# Patient Record
Sex: FEMALE | Race: WHITE | NOT HISPANIC OR LATINO
[De-identification: names, ages, dates, MRNs, and addresses within clinical notes are randomized per-mention and may not be internally consistent; named-entity substitution may affect disease eponyms.]

---

## 2018-01-17 ENCOUNTER — RESULT REVIEW (OUTPATIENT)
Age: 33
End: 2018-01-17

## 2018-07-23 ENCOUNTER — OUTPATIENT (OUTPATIENT)
Dept: OUTPATIENT SERVICES | Facility: HOSPITAL | Age: 33
LOS: 1 days | End: 2018-07-23
Payer: COMMERCIAL

## 2018-07-23 DIAGNOSIS — O26.899 OTHER SPECIFIED PREGNANCY RELATED CONDITIONS, UNSPECIFIED TRIMESTER: ICD-10-CM

## 2018-07-23 DIAGNOSIS — Z3A.00 WEEKS OF GESTATION OF PREGNANCY NOT SPECIFIED: ICD-10-CM

## 2018-07-23 PROCEDURE — 59025 FETAL NON-STRESS TEST: CPT

## 2018-07-23 PROCEDURE — 99214 OFFICE O/P EST MOD 30 MIN: CPT

## 2018-07-23 RX ADMIN — Medication 12 MILLIGRAM(S): at 14:03

## 2018-07-24 ENCOUNTER — OUTPATIENT (OUTPATIENT)
Dept: OUTPATIENT SERVICES | Facility: HOSPITAL | Age: 33
LOS: 1 days | End: 2018-07-24
Payer: COMMERCIAL

## 2018-07-24 DIAGNOSIS — Z3A.00 WEEKS OF GESTATION OF PREGNANCY NOT SPECIFIED: ICD-10-CM

## 2018-07-24 DIAGNOSIS — O26.899 OTHER SPECIFIED PREGNANCY RELATED CONDITIONS, UNSPECIFIED TRIMESTER: ICD-10-CM

## 2018-07-24 LAB
COLLECT DURATION TIME UR: 24 HR — SIGNIFICANT CHANGE UP
COLLECT DURATION TIME UR: 24 HR — SIGNIFICANT CHANGE UP
PROT 24H UR-MRATE: SIGNIFICANT CHANGE UP MG/24 H (ref 50–100)
TOTAL VOLUME - 24 HOUR: 2400 ML — SIGNIFICANT CHANGE UP
TOTAL VOLUME - 24 HOUR: 2400 ML — SIGNIFICANT CHANGE UP
URINE CREATININE CALCULATION: SIGNIFICANT CHANGE UP G/24 H (ref 0.8–1.8)

## 2018-07-24 PROCEDURE — 99214 OFFICE O/P EST MOD 30 MIN: CPT

## 2018-07-24 PROCEDURE — 76818 FETAL BIOPHYS PROFILE W/NST: CPT

## 2018-07-24 PROCEDURE — 84156 ASSAY OF PROTEIN URINE: CPT

## 2018-07-24 PROCEDURE — 82570 ASSAY OF URINE CREATININE: CPT

## 2018-07-24 RX ADMIN — Medication 12 MILLIGRAM(S): at 16:13

## 2018-07-27 ENCOUNTER — OUTPATIENT (OUTPATIENT)
Dept: OUTPATIENT SERVICES | Facility: HOSPITAL | Age: 33
LOS: 1 days | End: 2018-07-27
Payer: COMMERCIAL

## 2018-07-27 DIAGNOSIS — O26.899 OTHER SPECIFIED PREGNANCY RELATED CONDITIONS, UNSPECIFIED TRIMESTER: ICD-10-CM

## 2018-07-27 DIAGNOSIS — Z3A.00 WEEKS OF GESTATION OF PREGNANCY NOT SPECIFIED: ICD-10-CM

## 2018-07-27 LAB
COLLECT DURATION TIME UR: 24 HR — SIGNIFICANT CHANGE UP
COLLECT DURATION TIME UR: 24 HR — SIGNIFICANT CHANGE UP
PROT 24H UR-MRATE: 208 MG/24 H — HIGH (ref 50–100)
TOTAL VOLUME - 24 HOUR: 2600 ML — SIGNIFICANT CHANGE UP
TOTAL VOLUME - 24 HOUR: 2600 ML — SIGNIFICANT CHANGE UP
URINE CREATININE CALCULATION: 1.1 G/24 H — SIGNIFICANT CHANGE UP (ref 0.8–1.8)
URINE CREATININE CALCULATION: 1.1 G/24 H — SIGNIFICANT CHANGE UP (ref 0.8–1.8)

## 2018-07-27 PROCEDURE — 99214 OFFICE O/P EST MOD 30 MIN: CPT

## 2018-07-27 PROCEDURE — 82570 ASSAY OF URINE CREATININE: CPT

## 2018-07-27 PROCEDURE — 84156 ASSAY OF PROTEIN URINE: CPT

## 2018-07-27 PROCEDURE — 59025 FETAL NON-STRESS TEST: CPT

## 2018-07-29 ENCOUNTER — OUTPATIENT (OUTPATIENT)
Dept: OUTPATIENT SERVICES | Facility: HOSPITAL | Age: 33
LOS: 1 days | End: 2018-07-29
Payer: COMMERCIAL

## 2018-07-29 DIAGNOSIS — Z3A.00 WEEKS OF GESTATION OF PREGNANCY NOT SPECIFIED: ICD-10-CM

## 2018-07-29 DIAGNOSIS — O26.899 OTHER SPECIFIED PREGNANCY RELATED CONDITIONS, UNSPECIFIED TRIMESTER: ICD-10-CM

## 2018-07-29 PROCEDURE — 76818 FETAL BIOPHYS PROFILE W/NST: CPT

## 2018-07-29 PROCEDURE — 99214 OFFICE O/P EST MOD 30 MIN: CPT

## 2018-08-01 DIAGNOSIS — O09.613 SUPERVISION OF YOUNG PRIMIGRAVIDA, THIRD TRIMESTER: ICD-10-CM

## 2018-08-11 ENCOUNTER — OUTPATIENT (OUTPATIENT)
Dept: OUTPATIENT SERVICES | Facility: HOSPITAL | Age: 33
LOS: 1 days | End: 2018-08-11
Payer: COMMERCIAL

## 2018-08-11 DIAGNOSIS — O26.899 OTHER SPECIFIED PREGNANCY RELATED CONDITIONS, UNSPECIFIED TRIMESTER: ICD-10-CM

## 2018-08-11 DIAGNOSIS — Z3A.00 WEEKS OF GESTATION OF PREGNANCY NOT SPECIFIED: ICD-10-CM

## 2018-08-11 PROCEDURE — 76818 FETAL BIOPHYS PROFILE W/NST: CPT

## 2018-08-11 PROCEDURE — 99214 OFFICE O/P EST MOD 30 MIN: CPT

## 2018-08-13 ENCOUNTER — RESULT REVIEW (OUTPATIENT)
Age: 33
End: 2018-08-13

## 2018-08-13 ENCOUNTER — TRANSCRIPTION ENCOUNTER (OUTPATIENT)
Age: 33
End: 2018-08-13

## 2018-08-13 ENCOUNTER — INPATIENT (INPATIENT)
Facility: HOSPITAL | Age: 33
LOS: 3 days | Discharge: HOME | End: 2018-08-17
Attending: OBSTETRICS & GYNECOLOGY | Admitting: OBSTETRICS & GYNECOLOGY
Payer: COMMERCIAL

## 2018-08-13 VITALS — HEIGHT: 64 IN | WEIGHT: 149.91 LBS

## 2018-08-13 DIAGNOSIS — J45.20 MILD INTERMITTENT ASTHMA, UNCOMPLICATED: ICD-10-CM

## 2018-08-13 DIAGNOSIS — Z3A.37 37 WEEKS GESTATION OF PREGNANCY: ICD-10-CM

## 2018-08-13 DIAGNOSIS — O43.123 VELAMENTOUS INSERTION OF UMBILICAL CORD, THIRD TRIMESTER: ICD-10-CM

## 2018-08-13 DIAGNOSIS — O99.89 OTHER SPECIFIED DISEASES AND CONDITIONS COMPLICATING PREGNANCY, CHILDBIRTH AND THE PUERPERIUM: ICD-10-CM

## 2018-08-13 DIAGNOSIS — Q99.8 OTHER SPECIFIED CHROMOSOME ABNORMALITIES: ICD-10-CM

## 2018-08-13 DIAGNOSIS — O36.5930 MATERNAL CARE FOR OTHER KNOWN OR SUSPECTED POOR FETAL GROWTH, THIRD TRIMESTER, NOT APPLICABLE OR UNSPECIFIED: ICD-10-CM

## 2018-08-13 LAB
ALBUMIN SERPL ELPH-MCNC: 3.5 G/DL — SIGNIFICANT CHANGE UP (ref 3.3–5)
ALP SERPL-CCNC: 151 U/L — HIGH (ref 40–120)
ALT FLD-CCNC: 23 U/L — SIGNIFICANT CHANGE UP (ref 10–45)
ANION GAP SERPL CALC-SCNC: 14 MMOL/L — SIGNIFICANT CHANGE UP (ref 5–17)
APPEARANCE UR: CLEAR — SIGNIFICANT CHANGE UP
APTT BLD: 25.4 SEC — LOW (ref 27.5–37.4)
AST SERPL-CCNC: 25 U/L — SIGNIFICANT CHANGE UP (ref 10–40)
BASOPHILS NFR BLD AUTO: 0.1 % — SIGNIFICANT CHANGE UP (ref 0–2)
BILIRUB SERPL-MCNC: 0.4 MG/DL — SIGNIFICANT CHANGE UP (ref 0.2–1.2)
BILIRUB UR-MCNC: NEGATIVE — SIGNIFICANT CHANGE UP
BLD GP AB SCN SERPL QL: NEGATIVE — SIGNIFICANT CHANGE UP
BUN SERPL-MCNC: 6 MG/DL — LOW (ref 7–23)
CALCIUM SERPL-MCNC: 9.1 MG/DL — SIGNIFICANT CHANGE UP (ref 8.4–10.5)
CHLORIDE SERPL-SCNC: 102 MMOL/L — SIGNIFICANT CHANGE UP (ref 96–108)
CO2 SERPL-SCNC: 22 MMOL/L — SIGNIFICANT CHANGE UP (ref 22–31)
COLOR SPEC: YELLOW — SIGNIFICANT CHANGE UP
CREAT SERPL-MCNC: 0.44 MG/DL — LOW (ref 0.5–1.3)
DIFF PNL FLD: NEGATIVE — SIGNIFICANT CHANGE UP
EOSINOPHIL NFR BLD AUTO: 0.8 % — SIGNIFICANT CHANGE UP (ref 0–6)
FIBRINOGEN PPP-MCNC: 415 MG/DL — SIGNIFICANT CHANGE UP (ref 258–438)
GLUCOSE SERPL-MCNC: 78 MG/DL — SIGNIFICANT CHANGE UP (ref 70–99)
GLUCOSE UR QL: NEGATIVE — SIGNIFICANT CHANGE UP
HCT VFR BLD CALC: 37.2 % — SIGNIFICANT CHANGE UP (ref 34.5–45)
HGB BLD-MCNC: 11.9 G/DL — SIGNIFICANT CHANGE UP (ref 11.5–15.5)
INR BLD: 0.92 — SIGNIFICANT CHANGE UP (ref 0.88–1.16)
KETONES UR-MCNC: NEGATIVE — SIGNIFICANT CHANGE UP
LDH SERPL L TO P-CCNC: 226 U/L — SIGNIFICANT CHANGE UP (ref 50–242)
LEUKOCYTE ESTERASE UR-ACNC: NEGATIVE — SIGNIFICANT CHANGE UP
LYMPHOCYTES # BLD AUTO: 11.6 % — LOW (ref 13–44)
MCHC RBC-ENTMCNC: 28.9 PG — SIGNIFICANT CHANGE UP (ref 27–34)
MCHC RBC-ENTMCNC: 32 G/DL — SIGNIFICANT CHANGE UP (ref 32–36)
MCV RBC AUTO: 90.3 FL — SIGNIFICANT CHANGE UP (ref 80–100)
MONOCYTES NFR BLD AUTO: 5.8 % — SIGNIFICANT CHANGE UP (ref 2–14)
NEUTROPHILS NFR BLD AUTO: 81.7 % — HIGH (ref 43–77)
NITRITE UR-MCNC: NEGATIVE — SIGNIFICANT CHANGE UP
PH UR: 7.5 — SIGNIFICANT CHANGE UP (ref 5–8)
PLATELET # BLD AUTO: 145 K/UL — LOW (ref 150–400)
POTASSIUM SERPL-MCNC: 4.3 MMOL/L — SIGNIFICANT CHANGE UP (ref 3.5–5.3)
POTASSIUM SERPL-SCNC: 4.3 MMOL/L — SIGNIFICANT CHANGE UP (ref 3.5–5.3)
PROT SERPL-MCNC: 6.7 G/DL — SIGNIFICANT CHANGE UP (ref 6–8.3)
PROT UR-MCNC: NEGATIVE MG/DL — SIGNIFICANT CHANGE UP
PROTHROM AB SERPL-ACNC: 10.2 SEC — SIGNIFICANT CHANGE UP (ref 9.8–12.7)
RBC # BLD: 4.12 M/UL — SIGNIFICANT CHANGE UP (ref 3.8–5.2)
RBC # FLD: 13.5 % — SIGNIFICANT CHANGE UP (ref 10.3–16.9)
RH IG SCN BLD-IMP: POSITIVE — SIGNIFICANT CHANGE UP
RH IG SCN BLD-IMP: POSITIVE — SIGNIFICANT CHANGE UP
SODIUM SERPL-SCNC: 138 MMOL/L — SIGNIFICANT CHANGE UP (ref 135–145)
SP GR SPEC: 1.01 — SIGNIFICANT CHANGE UP (ref 1–1.03)
URATE SERPL-MCNC: 3.4 MG/DL — SIGNIFICANT CHANGE UP (ref 2.5–7)
UROBILINOGEN FLD QL: 0.2 E.U./DL — SIGNIFICANT CHANGE UP
WBC # BLD: 10.7 K/UL — HIGH (ref 3.8–10.5)
WBC # FLD AUTO: 10.7 K/UL — HIGH (ref 3.8–10.5)

## 2018-08-13 RX ORDER — CITRIC ACID/SODIUM CITRATE 300-500 MG
30 SOLUTION, ORAL ORAL ONCE
Qty: 0 | Refills: 0 | Status: DISCONTINUED | OUTPATIENT
Start: 2018-08-13 | End: 2018-08-16

## 2018-08-13 RX ORDER — HEPARIN SODIUM 5000 [USP'U]/ML
5000 INJECTION INTRAVENOUS; SUBCUTANEOUS EVERY 12 HOURS
Qty: 0 | Refills: 0 | Status: DISCONTINUED | OUTPATIENT
Start: 2018-08-13 | End: 2018-08-17

## 2018-08-13 RX ORDER — IBUPROFEN 200 MG
600 TABLET ORAL EVERY 6 HOURS
Qty: 0 | Refills: 0 | Status: DISCONTINUED | OUTPATIENT
Start: 2018-08-13 | End: 2018-08-17

## 2018-08-13 RX ORDER — LANOLIN
1 OINTMENT (GRAM) TOPICAL
Qty: 0 | Refills: 0 | Status: DISCONTINUED | OUTPATIENT
Start: 2018-08-13 | End: 2018-08-17

## 2018-08-13 RX ORDER — ONDANSETRON 8 MG/1
4 TABLET, FILM COATED ORAL EVERY 6 HOURS
Qty: 0 | Refills: 0 | Status: DISCONTINUED | OUTPATIENT
Start: 2018-08-13 | End: 2018-08-17

## 2018-08-13 RX ORDER — SODIUM CHLORIDE 9 MG/ML
1000 INJECTION, SOLUTION INTRAVENOUS ONCE
Qty: 0 | Refills: 0 | Status: DISCONTINUED | OUTPATIENT
Start: 2018-08-13 | End: 2018-08-13

## 2018-08-13 RX ORDER — OXYCODONE AND ACETAMINOPHEN 5; 325 MG/1; MG/1
1 TABLET ORAL
Qty: 0 | Refills: 0 | Status: DISCONTINUED | OUTPATIENT
Start: 2018-08-13 | End: 2018-08-17

## 2018-08-13 RX ORDER — NALOXONE HYDROCHLORIDE 4 MG/.1ML
0.1 SPRAY NASAL
Qty: 0 | Refills: 0 | Status: DISCONTINUED | OUTPATIENT
Start: 2018-08-13 | End: 2018-08-17

## 2018-08-13 RX ORDER — SODIUM CHLORIDE 9 MG/ML
1000 INJECTION, SOLUTION INTRAVENOUS
Qty: 0 | Refills: 0 | Status: DISCONTINUED | OUTPATIENT
Start: 2018-08-13 | End: 2018-08-17

## 2018-08-13 RX ORDER — TETANUS TOXOID, REDUCED DIPHTHERIA TOXOID AND ACELLULAR PERTUSSIS VACCINE, ADSORBED 5; 2.5; 8; 8; 2.5 [IU]/.5ML; [IU]/.5ML; UG/.5ML; UG/.5ML; UG/.5ML
0.5 SUSPENSION INTRAMUSCULAR ONCE
Qty: 0 | Refills: 0 | Status: DISCONTINUED | OUTPATIENT
Start: 2018-08-13 | End: 2018-08-17

## 2018-08-13 RX ORDER — OXYCODONE AND ACETAMINOPHEN 5; 325 MG/1; MG/1
2 TABLET ORAL EVERY 6 HOURS
Qty: 0 | Refills: 0 | Status: DISCONTINUED | OUTPATIENT
Start: 2018-08-13 | End: 2018-08-17

## 2018-08-13 RX ORDER — GLYCERIN ADULT
1 SUPPOSITORY, RECTAL RECTAL AT BEDTIME
Qty: 0 | Refills: 0 | Status: DISCONTINUED | OUTPATIENT
Start: 2018-08-13 | End: 2018-08-17

## 2018-08-13 RX ORDER — OXYTOCIN 10 UNIT/ML
1 VIAL (ML) INJECTION
Qty: 30 | Refills: 0 | Status: DISCONTINUED | OUTPATIENT
Start: 2018-08-13 | End: 2018-08-17

## 2018-08-13 RX ORDER — OXYTOCIN 10 UNIT/ML
41.67 VIAL (ML) INJECTION
Qty: 20 | Refills: 0 | Status: DISCONTINUED | OUTPATIENT
Start: 2018-08-13 | End: 2018-08-17

## 2018-08-13 RX ORDER — OXYTOCIN 10 UNIT/ML
333.33 VIAL (ML) INJECTION
Qty: 20 | Refills: 0 | Status: DISCONTINUED | OUTPATIENT
Start: 2018-08-13 | End: 2018-08-13

## 2018-08-13 RX ORDER — SIMETHICONE 80 MG/1
80 TABLET, CHEWABLE ORAL EVERY 4 HOURS
Qty: 0 | Refills: 0 | Status: DISCONTINUED | OUTPATIENT
Start: 2018-08-13 | End: 2018-08-17

## 2018-08-13 RX ORDER — DIPHENHYDRAMINE HCL 50 MG
25 CAPSULE ORAL EVERY 6 HOURS
Qty: 0 | Refills: 0 | Status: DISCONTINUED | OUTPATIENT
Start: 2018-08-13 | End: 2018-08-17

## 2018-08-13 RX ORDER — SODIUM CHLORIDE 9 MG/ML
1000 INJECTION, SOLUTION INTRAVENOUS
Qty: 0 | Refills: 0 | Status: DISCONTINUED | OUTPATIENT
Start: 2018-08-13 | End: 2018-08-13

## 2018-08-13 RX ORDER — DOCUSATE SODIUM 100 MG
100 CAPSULE ORAL
Qty: 0 | Refills: 0 | Status: DISCONTINUED | OUTPATIENT
Start: 2018-08-13 | End: 2018-08-17

## 2018-08-13 RX ORDER — CEFAZOLIN SODIUM 1 G
2000 VIAL (EA) INJECTION ONCE
Qty: 0 | Refills: 0 | Status: DISCONTINUED | OUTPATIENT
Start: 2018-08-13 | End: 2018-08-16

## 2018-08-13 RX ORDER — ACETAMINOPHEN 500 MG
650 TABLET ORAL EVERY 6 HOURS
Qty: 0 | Refills: 0 | Status: DISCONTINUED | OUTPATIENT
Start: 2018-08-13 | End: 2018-08-17

## 2018-08-13 RX ORDER — ALBUTEROL 90 UG/1
2 AEROSOL, METERED ORAL EVERY 6 HOURS
Qty: 0 | Refills: 0 | Status: DISCONTINUED | OUTPATIENT
Start: 2018-08-13 | End: 2018-08-17

## 2018-08-13 RX ORDER — DINOPROSTONE 10 MG/241MG
10 INSERT VAGINAL ONCE
Qty: 0 | Refills: 0 | Status: COMPLETED | OUTPATIENT
Start: 2018-08-13 | End: 2018-08-13

## 2018-08-13 RX ORDER — FERROUS SULFATE 325(65) MG
325 TABLET ORAL DAILY
Qty: 0 | Refills: 0 | Status: DISCONTINUED | OUTPATIENT
Start: 2018-08-13 | End: 2018-08-17

## 2018-08-13 RX ADMIN — DINOPROSTONE 10 MILLIGRAM(S): 10 INSERT VAGINAL at 13:30

## 2018-08-13 RX ADMIN — SODIUM CHLORIDE 125 MILLILITER(S): 9 INJECTION, SOLUTION INTRAVENOUS at 12:16

## 2018-08-13 NOTE — DISCHARGE NOTE OB - CARE PROVIDER_API CALL
Larisa Covarrubias (DO; MS), Obstetrics and Gynecology  [address] [place] [address]  Phone: [phone]  Fax: [phone]

## 2018-08-13 NOTE — PATIENT PROFILE OB - CURRENT PREGNANCY COMPLICATIONS, OB PROFILE
cord insertion/Intrauterine Growth Restriction marginal cord insertion/Intrauterine Growth Restriction

## 2018-08-13 NOTE — DISCHARGE NOTE OB - CARE PLAN
Principal Discharge DX:	Postpartum state  Goal:	discharge home  Assessment and plan of treatment:	Patient to be discharged home. Nothing per vagina, no tampons tub baths, intercourse. Patient to notify provider for fever >101, heavy vaginal bleeding, extreme abdominal pain. patient to follow up in 2 weeks

## 2018-08-13 NOTE — DISCHARGE NOTE OB - MATERIALS PROVIDED
Back To Sleep Handout/Tdap Vaccination (VIS Pub Date: January 24, 2012)/Guide to Postpartum Care/Shaken Baby Prevention Handout/Breastfeeding Guide and Packet/Birth Certificate Instructions

## 2018-08-13 NOTE — DISCHARGE NOTE OB - PATIENT PORTAL LINK FT
You can access the [employer] [facility] Patient Portal, offered by [facility], by registering with the following website: [email or web address]

## 2018-08-13 NOTE — DISCHARGE NOTE OB - PLAN OF CARE
discharge home Patient to be discharged home. Nothing per vagina, no tampons tub baths, intercourse. Patient to notify provider for fever >101, heavy vaginal bleeding, extreme abdominal pain. patient to follow up in 2 weeks

## 2018-08-14 LAB
HCT VFR BLD CALC: 36.5 % — SIGNIFICANT CHANGE UP (ref 34.5–45)
HGB BLD-MCNC: 12.1 G/DL — SIGNIFICANT CHANGE UP (ref 11.5–15.5)
MCHC RBC-ENTMCNC: 29.9 PG — SIGNIFICANT CHANGE UP (ref 27–34)
MCHC RBC-ENTMCNC: 33.2 G/DL — SIGNIFICANT CHANGE UP (ref 32–36)
MCV RBC AUTO: 90.1 FL — SIGNIFICANT CHANGE UP (ref 80–100)
PLATELET # BLD AUTO: 146 K/UL — LOW (ref 150–400)
RBC # BLD: 4.05 M/UL — SIGNIFICANT CHANGE UP (ref 3.8–5.2)
RBC # FLD: 13.2 % — SIGNIFICANT CHANGE UP (ref 10.3–16.9)
T PALLIDUM AB TITR SER: NEGATIVE — SIGNIFICANT CHANGE UP
WBC # BLD: 18.5 K/UL — HIGH (ref 3.8–10.5)
WBC # FLD AUTO: 18.5 K/UL — HIGH (ref 3.8–10.5)

## 2018-08-14 RX ADMIN — Medication 600 MILLIGRAM(S): at 07:12

## 2018-08-14 RX ADMIN — Medication 600 MILLIGRAM(S): at 19:00

## 2018-08-14 RX ADMIN — Medication 100 MILLIGRAM(S): at 23:58

## 2018-08-14 RX ADMIN — HEPARIN SODIUM 5000 UNIT(S): 5000 INJECTION INTRAVENOUS; SUBCUTANEOUS at 18:13

## 2018-08-14 RX ADMIN — Medication 600 MILLIGRAM(S): at 18:13

## 2018-08-14 RX ADMIN — Medication 600 MILLIGRAM(S): at 13:23

## 2018-08-14 RX ADMIN — Medication 600 MILLIGRAM(S): at 12:41

## 2018-08-14 RX ADMIN — Medication 600 MILLIGRAM(S): at 23:57

## 2018-08-14 RX ADMIN — Medication 325 MILLIGRAM(S): at 12:41

## 2018-08-14 RX ADMIN — Medication 600 MILLIGRAM(S): at 06:46

## 2018-08-14 RX ADMIN — Medication 1 TABLET(S): at 12:41

## 2018-08-14 RX ADMIN — HEPARIN SODIUM 5000 UNIT(S): 5000 INJECTION INTRAVENOUS; SUBCUTANEOUS at 06:46

## 2018-08-14 NOTE — PROGRESS NOTE ADULT - ASSESSMENT
33y Female POD# 1   s/p C/S, Uncomplicated                                       1. Neuro/Pain:  OPM  2  CV:  VS per routine  3. Pulm: Encourage ISS & Ambulation  4. GI:  Reg  5. : Voiding  6. DVT ppx: SCDs, SQH 5000 mg BID  7. Dispo: POD #3 or #4

## 2018-08-14 NOTE — PROGRESS NOTE ADULT - SUBJECTIVE AND OBJECTIVE BOX
Patient evaluated at bedside.   She reports pain is well controlled.  She denies headache, dizziness, chest pain, palpitations, shortness of breathe, nausea, vomiting or heavy vaginal bleeding.  She has not yet been ambulating without assistance, had rios catheter removed this AM and is awaiting TOV, is not yet passing gas, tolerating regular diet and is breastfeeding.    Physical Exam:  Vital Signs Last 24 Hrs  T(C): 36.7 (14 Aug 2018 06:00), Max: 37.1 (14 Aug 2018 02:00)  T(F): 98 (14 Aug 2018 06:00), Max: 98.7 (14 Aug 2018 02:00)  HR: 68 (14 Aug 2018 06:00) (60 - 82)  BP: 106/65 (14 Aug 2018 06:00) (106/65 - 137/78)  BP(mean): --  RR: 16 (14 Aug 2018 06:00) (16 - 20)  SpO2: 99% (14 Aug 2018 06:00) (97% - 100%)    08-13 @ 07:01  -  08-14 @ 07:00  --------------------------------------------------------  IN: 500 mL / OUT: 2715 mL / NET: -2215 mL        GA: NAD, A+0 x 3  CV: RRR  Pulm: Normal work of breathing  Breasts: soft, nontender, no palpable masses  Abd: + BS, soft, nontender, nondistended, no rebound or guarding, uterus firm at midline,  fundus below umbilicus  Incision: well approximated, no erythema or discharge  : lochia WNL  Extremities: no swelling or calf tenderness                            11.9   10.7  )-----------( 145      ( 13 Aug 2018 12:40 )             37.2     08-13    138  |  102  |  6<L>  ----------------------------<  78  4.3   |  22  |  0.44<L>    Ca    9.1      13 Aug 2018 12:40    TPro  6.7  /  Alb  3.5  /  TBili  0.4  /  DBili  x   /  AST  25  /  ALT  23  /  AlkPhos  151<H>  08-13      PT/INR - ( 13 Aug 2018 12:40 )   PT: 10.2 sec;   INR: 0.92          PTT - ( 13 Aug 2018 12:40 )  PTT:25.4 sec Patient evaluated at bedside.   She reports pain is well controlled.  She denies headache, dizziness, chest pain, palpitations, shortness of breathe, nausea, vomiting or heavy vaginal bleeding.  She has not yet been ambulating without assistance, had rios catheter removed this AM and is awaiting TOV, is not yet passing gas, tolerating regular diet and is expressing.    Physical Exam:  Vital Signs Last 24 Hrs  T(C): 36.7 (14 Aug 2018 06:00), Max: 37.1 (14 Aug 2018 02:00)  T(F): 98 (14 Aug 2018 06:00), Max: 98.7 (14 Aug 2018 02:00)  HR: 68 (14 Aug 2018 06:00) (60 - 82)  BP: 106/65 (14 Aug 2018 06:00) (106/65 - 137/78)  BP(mean): --  RR: 16 (14 Aug 2018 06:00) (16 - 20)  SpO2: 99% (14 Aug 2018 06:00) (97% - 100%)    08-13 @ 07:01  -  08-14 @ 07:00  --------------------------------------------------------  IN: 500 mL / OUT: 2715 mL / NET: -2215 mL        GA: NAD, A+0 x 3  CV: RRR  Pulm: Normal work of breathing  Breasts: soft, nontender, no palpable masses  Abd: + BS, soft, nontender, nondistended, no rebound or guarding, uterus firm at midline,  fundus below umbilicus  Incision: well approximated, no erythema or discharge  : lochia WNL  Extremities: no swelling or calf tenderness                            11.9   10.7  )-----------( 145      ( 13 Aug 2018 12:40 )             37.2     08-13    138  |  102  |  6<L>  ----------------------------<  78  4.3   |  22  |  0.44<L>    Ca    9.1      13 Aug 2018 12:40    TPro  6.7  /  Alb  3.5  /  TBili  0.4  /  DBili  x   /  AST  25  /  ALT  23  /  AlkPhos  151<H>  08-13      PT/INR - ( 13 Aug 2018 12:40 )   PT: 10.2 sec;   INR: 0.92          PTT - ( 13 Aug 2018 12:40 )  PTT:25.4 sec

## 2018-08-15 LAB — EXTRA LAVENDER TOP TUBE: SIGNIFICANT CHANGE UP

## 2018-08-15 RX ADMIN — HEPARIN SODIUM 5000 UNIT(S): 5000 INJECTION INTRAVENOUS; SUBCUTANEOUS at 18:06

## 2018-08-15 RX ADMIN — Medication 600 MILLIGRAM(S): at 00:27

## 2018-08-15 RX ADMIN — Medication 100 MILLIGRAM(S): at 20:49

## 2018-08-15 RX ADMIN — Medication 325 MILLIGRAM(S): at 12:31

## 2018-08-15 RX ADMIN — Medication 1 TABLET(S): at 12:31

## 2018-08-15 RX ADMIN — Medication 600 MILLIGRAM(S): at 18:04

## 2018-08-15 RX ADMIN — Medication 600 MILLIGRAM(S): at 05:44

## 2018-08-15 RX ADMIN — Medication 650 MILLIGRAM(S): at 10:39

## 2018-08-15 RX ADMIN — HEPARIN SODIUM 5000 UNIT(S): 5000 INJECTION INTRAVENOUS; SUBCUTANEOUS at 05:44

## 2018-08-15 RX ADMIN — Medication 100 MILLIGRAM(S): at 05:45

## 2018-08-15 RX ADMIN — Medication 650 MILLIGRAM(S): at 09:42

## 2018-08-15 RX ADMIN — Medication 650 MILLIGRAM(S): at 20:50

## 2018-08-15 RX ADMIN — Medication 600 MILLIGRAM(S): at 16:31

## 2018-08-15 RX ADMIN — Medication 650 MILLIGRAM(S): at 21:20

## 2018-08-15 RX ADMIN — Medication 600 MILLIGRAM(S): at 12:31

## 2018-08-15 RX ADMIN — Medication 600 MILLIGRAM(S): at 23:37

## 2018-08-15 RX ADMIN — Medication 600 MILLIGRAM(S): at 06:14

## 2018-08-15 RX ADMIN — Medication 600 MILLIGRAM(S): at 19:01

## 2018-08-15 NOTE — PROGRESS NOTE ADULT - ASSESSMENT
33y Female POD#2    s/p C/S, Uncomplicated                                       1. Neuro/Pain:  OPM  2  CV:  VS per routine  3. Pulm: Encourage ISS & Ambulation  4. GI:  Reg  5. : Voiding  6. DVT ppx: SCDs, SQH 5000 mg BID  7. Dispo: POD #3 or #4

## 2018-08-15 NOTE — PROGRESS NOTE ADULT - SUBJECTIVE AND OBJECTIVE BOX
Patient evaluated at bedside.   She reports pain is well controlled.  She denies headache, dizziness, chest pain, palpitations, shortness of breathe, nausea, vomiting or heavy vaginal bleeding.  She has been ambulating without assistance, voiding spontaneously, passing gas, tolerating regular diet and is breastfeeding.    Physical Exam:  Vital Signs Last 24 Hrs  T(C): 36.7 (15 Aug 2018 06:00), Max: 36.7 (14 Aug 2018 10:15)  T(F): 98 (15 Aug 2018 06:00), Max: 98 (14 Aug 2018 10:15)  HR: 67 (15 Aug 2018 06:00) (61 - 93)  BP: 113/74 (15 Aug 2018 06:00) (99/63 - 136/76)  BP(mean): --  RR: 17 (15 Aug 2018 06:00) (16 - 17)  SpO2: 100% (15 Aug 2018 06:00) (97% - 100%)    08-13 @ 07:01  -  08-14 @ 07:00  --------------------------------------------------------  IN: 500 mL / OUT: 2715 mL / NET: -2215 mL    08-14 @ 07:01 - 08-15 @ 06:53  --------------------------------------------------------  IN: 0 mL / OUT: 1600 mL / NET: -1600 mL        GA: NAD, A+0 x 3  CV: RRR  Pulm: Normal work of breathing  Breasts: soft, nontender, no palpable masses  Abd: + BS, soft, nontender, nondistended, no rebound or guarding, uterus firm at midline,  fundus below umbilicus  Incision: well approximated, no erythema or discharge  : lochia WNL  Extremities: no swelling or calf tenderness                            12.1   18.5  )-----------( 146      ( 14 Aug 2018 08:23 )             36.5     08-13    138  |  102  |  6<L>  ----------------------------<  78  4.3   |  22  |  0.44<L>    Ca    9.1      13 Aug 2018 12:40    TPro  6.7  /  Alb  3.5  /  TBili  0.4  /  DBili  x   /  AST  25  /  ALT  23  /  AlkPhos  151<H>  08-13      PT/INR - ( 13 Aug 2018 12:40 )   PT: 10.2 sec;   INR: 0.92          PTT - ( 13 Aug 2018 12:40 )  PTT:25.4 sec Patient evaluated at bedside.   She reports pain is well controlled.  She denies headache, dizziness, chest pain, palpitations, shortness of breathe, nausea, vomiting or heavy vaginal bleeding.  She has been ambulating without assistance, voiding spontaneously, passing gas, tolerating regular diet and is  pumping    Physical Exam:  Vital Signs Last 24 Hrs  T(C): 36.7 (15 Aug 2018 06:00), Max: 36.7 (14 Aug 2018 10:15)  T(F): 98 (15 Aug 2018 06:00), Max: 98 (14 Aug 2018 10:15)  HR: 67 (15 Aug 2018 06:00) (61 - 93)  BP: 113/74 (15 Aug 2018 06:00) (99/63 - 136/76)  BP(mean): --  RR: 17 (15 Aug 2018 06:00) (16 - 17)  SpO2: 100% (15 Aug 2018 06:00) (97% - 100%)    08-13 @ 07:01  -  08-14 @ 07:00  --------------------------------------------------------  IN: 500 mL / OUT: 2715 mL / NET: -2215 mL    08-14 @ 07:01  -  08-15 @ 06:53  --------------------------------------------------------  IN: 0 mL / OUT: 1600 mL / NET: -1600 mL        GA: NAD, A+0 x 3  CV: RRR  Pulm: Normal work of breathing  Breasts: soft, nontender, no palpable masses  Abd: + BS, soft, nontender, nondistended, no rebound or guarding, uterus firm at midline,  fundus below umbilicus  Incision: well approximated, no erythema or discharge  : lochia WNL  Extremities: no swelling or calf tenderness                            12.1   18.5  )-----------( 146      ( 14 Aug 2018 08:23 )             36.5     08-13    138  |  102  |  6<L>  ----------------------------<  78  4.3   |  22  |  0.44<L>    Ca    9.1      13 Aug 2018 12:40    TPro  6.7  /  Alb  3.5  /  TBili  0.4  /  DBili  x   /  AST  25  /  ALT  23  /  AlkPhos  151<H>  08-13      PT/INR - ( 13 Aug 2018 12:40 )   PT: 10.2 sec;   INR: 0.92          PTT - ( 13 Aug 2018 12:40 )  PTT:25.4 sec

## 2018-08-16 RX ORDER — POLYETHYLENE GLYCOL 3350 17 G/17G
17 POWDER, FOR SOLUTION ORAL DAILY
Qty: 0 | Refills: 0 | Status: DISCONTINUED | OUTPATIENT
Start: 2018-08-16 | End: 2018-08-17

## 2018-08-16 RX ADMIN — HEPARIN SODIUM 5000 UNIT(S): 5000 INJECTION INTRAVENOUS; SUBCUTANEOUS at 05:55

## 2018-08-16 RX ADMIN — Medication 600 MILLIGRAM(S): at 20:27

## 2018-08-16 RX ADMIN — Medication 600 MILLIGRAM(S): at 06:25

## 2018-08-16 RX ADMIN — Medication 600 MILLIGRAM(S): at 20:57

## 2018-08-16 RX ADMIN — Medication 600 MILLIGRAM(S): at 13:28

## 2018-08-16 RX ADMIN — Medication 325 MILLIGRAM(S): at 12:53

## 2018-08-16 RX ADMIN — Medication 1 TABLET(S): at 12:53

## 2018-08-16 RX ADMIN — Medication 100 MILLIGRAM(S): at 12:56

## 2018-08-16 RX ADMIN — Medication 600 MILLIGRAM(S): at 05:55

## 2018-08-16 RX ADMIN — Medication 600 MILLIGRAM(S): at 00:07

## 2018-08-16 RX ADMIN — Medication 600 MILLIGRAM(S): at 12:53

## 2018-08-16 NOTE — PROGRESS NOTE ADULT - ASSESSMENT
33y Female POD#3    s/p C/S, Uncomplicated                                       1. Neuro/Pain:  OPM  2  CV:  VS per routine  3. Pulm: Encourage ISS & Ambulation  4. GI:  Reg  5. : Voiding  6. DVT ppx: SCDs, SQH 5000 mg BID  7. Dispo: POD #3 or #4

## 2018-08-16 NOTE — PROGRESS NOTE ADULT - ATTENDING COMMENTS
Patient not in room during rounds. Plan to dc home tomorrow. Baby In NCCU.
Patient seen and examined at bedside. Agree with above evaluation. Patient doing well and in good mood. Baby in NCCU, patient is hand expressing and pumping for colostrum. Plan for discharge POD 4
Patient seen and examined at bedside. Agree with above evaluation. Patient has no current complaints. Pain manageable with motrin. Patient is expressing breast milk for baby in NCCU. Plan for discharge POD4

## 2018-08-16 NOTE — LACTATION INITIAL EVALUATION - NS LACT CON REASON FOR REQ
37wker in NICU due to SGA with severe IUGR. now 3 days old. mom reports baby has made latch attempts, but they are limiting her time due to small size. collecting 30-60ml colostrum via pump. reviewed pump instructions and pump flange size, strategies to maximize supply. provided written handout with pumping log. mom has spectra s2 at home, plan for d/c tomorrow, baby will remain in NICU. mom tearful. emotional support provided. encouraged ssc and practice sessions at breast, pumping 8-12x/day for 15-20min. encouraged mom to rest and care for herself as a priority. answered all questions./primaparous mom

## 2018-08-16 NOTE — PROGRESS NOTE ADULT - SUBJECTIVE AND OBJECTIVE BOX
Patient evaluated at bedside.   She reports pain is well controlled.  She denies headache, dizziness, chest pain, palpitations, shortness of breathe, nausea, vomiting or heavy vaginal bleeding.  She has been ambulating without assistance, voiding spontaneously, passing gas, tolerating regular diet and is breastfeeding.    Physical Exam:  Vital Signs Last 24 Hrs  T(C): 36.3 (16 Aug 2018 06:00), Max: 36.7 (15 Aug 2018 14:48)  T(F): 97.4 (16 Aug 2018 06:00), Max: 98.1 (15 Aug 2018 14:48)  HR: 68 (16 Aug 2018 06:00) (68 - 82)  BP: 125/82 (16 Aug 2018 06:00) (116/76 - 125/82)  BP(mean): --  RR: 17 (16 Aug 2018 06:00) (17 - 18)  SpO2: 100% (16 Aug 2018 06:00) (99% - 100%)      GA: NAD, A+0 x 3  CV: RRR  Pulm: Normal work of breathing  Breasts: soft, nontender, no palpable masses  Abd: + BS, soft, nontender, nondistended, no rebound or guarding, uterus firm at midline,  fundus below umbilicus  Incision: well approximated, no erythema or discharge  : lochia WNL  Extremities: no swelling or calf tenderness                            12.1   18.5  )-----------( 146      ( 14 Aug 2018 08:23 )             36.5

## 2018-08-16 NOTE — LACTATION INITIAL EVALUATION - PRO FEM REPRO BREASTFEED YN
baby is placed at breast and will suckle for a few minutes and then falls asleep. nicu staff are limiting time at breast due to baby's small size.

## 2018-08-17 VITALS
RESPIRATION RATE: 17 BRPM | SYSTOLIC BLOOD PRESSURE: 125 MMHG | OXYGEN SATURATION: 99 % | HEART RATE: 68 BPM | DIASTOLIC BLOOD PRESSURE: 73 MMHG | TEMPERATURE: 98 F

## 2018-08-17 PROCEDURE — 36415 COLL VENOUS BLD VENIPUNCTURE: CPT

## 2018-08-17 PROCEDURE — 85730 THROMBOPLASTIN TIME PARTIAL: CPT

## 2018-08-17 PROCEDURE — 84550 ASSAY OF BLOOD/URIC ACID: CPT

## 2018-08-17 PROCEDURE — C1889: CPT

## 2018-08-17 PROCEDURE — 85610 PROTHROMBIN TIME: CPT

## 2018-08-17 PROCEDURE — 83615 LACTATE (LD) (LDH) ENZYME: CPT

## 2018-08-17 PROCEDURE — 88307 TISSUE EXAM BY PATHOLOGIST: CPT

## 2018-08-17 PROCEDURE — 86900 BLOOD TYPING SEROLOGIC ABO: CPT

## 2018-08-17 PROCEDURE — 86780 TREPONEMA PALLIDUM: CPT

## 2018-08-17 PROCEDURE — 85027 COMPLETE CBC AUTOMATED: CPT

## 2018-08-17 PROCEDURE — 85025 COMPLETE CBC W/AUTO DIFF WBC: CPT

## 2018-08-17 PROCEDURE — 85384 FIBRINOGEN ACTIVITY: CPT

## 2018-08-17 PROCEDURE — 86901 BLOOD TYPING SEROLOGIC RH(D): CPT

## 2018-08-17 PROCEDURE — 86850 RBC ANTIBODY SCREEN: CPT

## 2018-08-17 PROCEDURE — 81003 URINALYSIS AUTO W/O SCOPE: CPT

## 2018-08-17 PROCEDURE — C1765: CPT

## 2018-08-17 PROCEDURE — 80053 COMPREHEN METABOLIC PANEL: CPT

## 2018-08-17 RX ADMIN — Medication 600 MILLIGRAM(S): at 04:48

## 2018-08-17 RX ADMIN — Medication 100 MILLIGRAM(S): at 11:38

## 2018-08-17 RX ADMIN — Medication 1 TABLET(S): at 11:37

## 2018-08-17 RX ADMIN — Medication 600 MILLIGRAM(S): at 12:30

## 2018-08-17 RX ADMIN — Medication 600 MILLIGRAM(S): at 11:38

## 2018-08-17 RX ADMIN — Medication 325 MILLIGRAM(S): at 11:37

## 2018-08-17 RX ADMIN — Medication 600 MILLIGRAM(S): at 04:18

## 2018-08-17 NOTE — PROGRESS NOTE ADULT - SUBJECTIVE AND OBJECTIVE BOX
Patient evaluated at bedside.   She reports pain is well controlled.  She denies headache, dizziness, chest pain, palpitations, shortness of breathe, nausea, vomiting or heavy vaginal bleeding.  She has been ambulating without assistance, voiding spontaneously, passing gas, tolerating regular diet and is breastfeeding.    Physical Exam:  Vital Signs Last 24 Hrs  T(C): 36.4 (17 Aug 2018 06:00), Max: 36.7 (16 Aug 2018 22:06)  T(F): 97.6 (17 Aug 2018 06:00), Max: 98 (16 Aug 2018 22:06)  HR: 68 (17 Aug 2018 06:00) (64 - 82)  BP: 125/73 (17 Aug 2018 06:00) (125/73 - 130/84)  BP(mean): --  RR: 17 (17 Aug 2018 06:00) (17 - 20)  SpO2: 99% (17 Aug 2018 06:00) (99% - 100%)      GA: NAD, A+0 x 3  CV: RRR  Pulm: Normal work of breathing  Breasts: soft, nontender, no palpable masses  Abd: + BS, soft, nontender, nondistended, no rebound or guarding, uterus firm at midline,  fundus below umbilicus  Incision: well approximated, no erythema or discharge  : lochia WNL  Extremities: no swelling or calf tenderness

## 2018-08-17 NOTE — PROGRESS NOTE ADULT - ASSESSMENT
33y Female POD#4    s/p C/S, Uncomplicated                                       1. Neuro/Pain:  OPM  2  CV:  VS per routine  3. Pulm: Encourage ISS & Ambulation  4. GI:  Reg  5. : Voiding  6. DVT ppx: SCDs, SQH 5000 mg BID  7. Dispo: POD #3 or #4

## 2018-08-18 LAB — SURGICAL PATHOLOGY STUDY: SIGNIFICANT CHANGE UP

## 2018-08-22 LAB — MISCELLANEOUS TEST NAME: SIGNIFICANT CHANGE UP

## 2020-04-26 ENCOUNTER — MESSAGE (OUTPATIENT)
Age: 35
End: 2020-04-26

## 2020-05-08 ENCOUNTER — APPOINTMENT (OUTPATIENT)
Dept: DISASTER EMERGENCY | Facility: HOSPITAL | Age: 35
End: 2020-05-08

## 2020-05-09 LAB
SARS-COV-2 IGG SERPL IA-ACNC: 0 INDEX
SARS-COV-2 IGG SERPL QL IA: NEGATIVE

## 2020-06-01 PROBLEM — Z00.00 ENCOUNTER FOR PREVENTIVE HEALTH EXAMINATION: Status: ACTIVE | Noted: 2020-06-01

## 2020-06-22 ENCOUNTER — TRANSCRIPTION ENCOUNTER (OUTPATIENT)
Age: 35
End: 2020-06-22

## 2020-06-22 ENCOUNTER — OUTPATIENT (OUTPATIENT)
Dept: OUTPATIENT SERVICES | Facility: HOSPITAL | Age: 35
LOS: 1 days | Discharge: HOME | End: 2020-06-22

## 2020-06-22 DIAGNOSIS — D64.9 ANEMIA, UNSPECIFIED: ICD-10-CM

## 2020-06-25 ENCOUNTER — APPOINTMENT (OUTPATIENT)
Dept: HEMATOLOGY ONCOLOGY | Facility: CLINIC | Age: 35
End: 2020-06-25
Payer: COMMERCIAL

## 2020-06-25 DIAGNOSIS — J45.909 UNSPECIFIED ASTHMA, UNCOMPLICATED: ICD-10-CM

## 2020-06-25 DIAGNOSIS — D68.59 OTHER PRIMARY THROMBOPHILIA: ICD-10-CM

## 2020-06-25 PROCEDURE — 99204 OFFICE O/P NEW MOD 45 MIN: CPT | Mod: 95

## 2020-06-25 RX ORDER — LEVONORGESTREL 52 MG/1
20 INTRAUTERINE DEVICE INTRAUTERINE
Refills: 0 | Status: ACTIVE | COMMUNITY
Start: 2020-06-25

## 2020-06-25 NOTE — ASSESSMENT
[FreeTextEntry1] : This is a 34 year old female who is here to see me for a possible hypercoagulable state given her history of IUGR and placental findings as well as possible thrombocytopenia in the  period  in  the future given her daughter required platelet transfusion when she was born. She also received IVIG.\par \par 1. Hypercoag work up. patient will go next week. A tnext visit confirm no history of miscarraiges\par \par 2. Will reach out to discuss placental findings with pathology \par \par 3. Follow up Hpa platelet antbody testing (sent out from [facility])\par \par -return follow up visit TEB 4 weeks

## 2020-06-25 NOTE — PHYSICAL EXAM
[Fully active, able to carry on all pre-disease performance without restriction] : Status 0 - Fully active, able to carry on all pre-disease performance without restriction [Normal] : well developed, well nourished, in no acute distress [Thin] : thin

## 2020-06-25 NOTE — HISTORY OF PRESENT ILLNESS
[Disease:__________________________] : Disease: [unfilled] [de-identified] : This is a 34 year old female here for possible hypercoagulable state.\par \par She had a vilamentous cord insertion and had IUGR during her pregnancy. She was delivered at 37 weeks by c section, baby was very small- was approximately 3.5lbs.  Placenta was examined and was found to have  focal markedly increased intervillous fibrin deposition associated with peripheral infarct, and diffusely increased maternal plate calcifications.  \par \par Additionally, at birth patient had low platelets, requiring platelet transfusion,  she had HPA antigen testing but never was able to get the result from [facility]. \par Delivered at [facility], but now on [facility], will establish care with OB on .\par \par She reports she has had no personal history of clotting. She did not describe any miscarraiges .She is thinking about trying to conceive again and wants to ensure no hypercoagulable state. Also wants to understand more about the thrombocytipenia  requiring transfusion that her daugther had.\par \par She denies any increased bleeding or bruising. \par  [de-identified] : \par \par HPA1a/1b heterozygous - daugther \par \par

## 2020-06-30 ENCOUNTER — LABORATORY RESULT (OUTPATIENT)
Age: 35
End: 2020-06-30

## 2020-07-20 ENCOUNTER — RESULT REVIEW (OUTPATIENT)
Age: 35
End: 2020-07-20

## 2020-07-23 ENCOUNTER — OUTPATIENT (OUTPATIENT)
Dept: OUTPATIENT SERVICES | Facility: HOSPITAL | Age: 35
LOS: 1 days | Discharge: HOME | End: 2020-07-23

## 2020-07-23 DIAGNOSIS — D64.9 ANEMIA, UNSPECIFIED: ICD-10-CM

## 2020-07-28 ENCOUNTER — APPOINTMENT (OUTPATIENT)
Dept: HEMATOLOGY ONCOLOGY | Facility: CLINIC | Age: 35
End: 2020-07-28
Payer: COMMERCIAL

## 2020-07-28 PROCEDURE — 99214 OFFICE O/P EST MOD 30 MIN: CPT | Mod: 95

## 2020-07-29 NOTE — HISTORY OF PRESENT ILLNESS
[Disease:__________________________] : Disease: [unfilled] [de-identified] : Patient has Hpa-1a/1a homozygous\par Daughter 1a/1b heterozygous\par \par Of note Frieda Feng was born 8/13/[birth year] with IUGR 1.79kg. She was polycythemic and thrombocytopenic at birth.  \par On 8/13 plt was 44, 8/14/18 was 59 and then 8/15/18 29, was given platelet transfusion and IVIG and platelet on 8/16/20 [id number] and then 8/17/20 was 147\par Baby is O+, Mother B+\par \par Ludivina's platelet count upon delivery was 145. \par  [de-identified] : This is a 34 year old female here for follow up of heterozygous FVL in the setting of IUGR as weel as thrombocytopenia in her , now preconception planning. \par \par She had a vilamentous cord insertion and had IUGR during her pregnancy. She was delivered at 37 weeks by c section, baby was very small- was approximately 3.5lbs.  Placenta was examined and was found to have  focal markedly increased intervillous fibrin deposition associated with peripheral infarct, and diffusely increased maternal plate calcifications.  \par \par Additionally, at birth patient had low platelets, requiring platelet transfusion,  she had HPA antigen testing but never was able to get the result from [facility]. \par Delivered at [facility], but now on [place], will establish care with OB on .\par \par She reports she has had no personal history of clotting. She did not describe any miscarraiges .She is thinking about trying to conceive again and wants to ensure no hypercoagulable state. Also wants to understand more about the thrombocytipenia  requiring transfusion that her daugther had.\par \par She denies any increased bleeding or bruising. \par

## 2020-07-29 NOTE — ASSESSMENT
[FreeTextEntry1] : This is a 35 year old female who is here to see me for a possible hypercoagulable state as well as  thrombocytopenia and peripartum management:\par \par 1. FVL positive: will discuss findings with path of the placenta- they are pulling the slides, however given heterozygous FVL and a history of IUGR would favor lovenox ppx during pregnancy of 40mg; would discuss this with patient's future ob once she establishes care\par \par 2. Patient is homozygous for 1a/1a, her daugther has 1a/1b. It does not appear to occur that maternal antibodies are present directed towards the 1b, however will investigate further and determine whether indicated to perform further work up. Will determine fathers HPA as well. FaVor sending anti-platelet antibody (for auto antibody) Dalia and RF to get a sense of possible auto-antibody, and also pending father testing and above, possibly send for maternal HPA antibody titers?\par -given the clinical scenario and the current information, FNAIT does not seem likely , more likely related to placental changes, and IUGR\par

## 2021-03-23 ENCOUNTER — RESULT REVIEW (OUTPATIENT)
Age: 36
End: 2021-03-23

## 2021-03-23 ENCOUNTER — APPOINTMENT (OUTPATIENT)
Dept: HEMATOLOGY ONCOLOGY | Facility: CLINIC | Age: 36
End: 2021-03-23

## 2021-03-23 ENCOUNTER — OUTPATIENT (OUTPATIENT)
Dept: OUTPATIENT SERVICES | Facility: HOSPITAL | Age: 36
LOS: 1 days | Discharge: HOME | End: 2021-03-23

## 2021-03-23 ENCOUNTER — LABORATORY RESULT (OUTPATIENT)
Age: 36
End: 2021-03-23

## 2021-03-23 DIAGNOSIS — D64.9 ANEMIA, UNSPECIFIED: ICD-10-CM

## 2021-03-23 LAB
BASOPHILS # BLD AUTO: 0.03 K/UL — SIGNIFICANT CHANGE UP (ref 0–0.2)
BASOPHILS NFR BLD AUTO: 0.3 % — SIGNIFICANT CHANGE UP (ref 0–2)
EOSINOPHIL # BLD AUTO: 0.09 K/UL — SIGNIFICANT CHANGE UP (ref 0–0.5)
EOSINOPHIL NFR BLD AUTO: 1 % — SIGNIFICANT CHANGE UP (ref 0–6)
HCT VFR BLD CALC: 35.8 % — SIGNIFICANT CHANGE UP (ref 34.5–45)
HGB BLD-MCNC: 12 G/DL — SIGNIFICANT CHANGE UP (ref 11.5–15.5)
IMM GRANULOCYTES NFR BLD AUTO: 0.3 % — SIGNIFICANT CHANGE UP (ref 0–1.5)
LYMPHOCYTES # BLD AUTO: 1.3 K/UL — SIGNIFICANT CHANGE UP (ref 1–3.3)
LYMPHOCYTES # BLD AUTO: 14.7 % — SIGNIFICANT CHANGE UP (ref 13–44)
MCHC RBC-ENTMCNC: 29.3 PG — SIGNIFICANT CHANGE UP (ref 27–34)
MCHC RBC-ENTMCNC: 33.5 G/DL — SIGNIFICANT CHANGE UP (ref 32–36)
MCV RBC AUTO: 87.3 FL — SIGNIFICANT CHANGE UP (ref 80–100)
MONOCYTES # BLD AUTO: 0.44 K/UL — SIGNIFICANT CHANGE UP (ref 0–0.9)
MONOCYTES NFR BLD AUTO: 5 % — SIGNIFICANT CHANGE UP (ref 2–14)
NEUTROPHILS # BLD AUTO: 6.98 K/UL — SIGNIFICANT CHANGE UP (ref 1.8–7.4)
NEUTROPHILS NFR BLD AUTO: 78.7 % — HIGH (ref 43–77)
NRBC # BLD: 0 /100 WBCS — SIGNIFICANT CHANGE UP (ref 0–0)
PLATELET # BLD AUTO: 216 K/UL — SIGNIFICANT CHANGE UP (ref 150–400)
RBC # BLD: 4.1 M/UL — SIGNIFICANT CHANGE UP (ref 3.8–5.2)
RBC # FLD: 12.1 % — SIGNIFICANT CHANGE UP (ref 10.3–14.5)
WBC # BLD: 8.87 K/UL — SIGNIFICANT CHANGE UP (ref 3.8–10.5)
WBC # FLD AUTO: 8.87 K/UL — SIGNIFICANT CHANGE UP (ref 3.8–10.5)

## 2021-04-28 ENCOUNTER — OUTPATIENT (OUTPATIENT)
Dept: OUTPATIENT SERVICES | Facility: HOSPITAL | Age: 36
LOS: 1 days | Discharge: HOME | End: 2021-04-28

## 2021-04-28 DIAGNOSIS — D64.9 ANEMIA, UNSPECIFIED: ICD-10-CM

## 2021-04-30 ENCOUNTER — APPOINTMENT (OUTPATIENT)
Dept: HEMATOLOGY ONCOLOGY | Facility: CLINIC | Age: 36
End: 2021-04-30

## 2021-04-30 ENCOUNTER — RESULT REVIEW (OUTPATIENT)
Age: 36
End: 2021-04-30

## 2021-04-30 ENCOUNTER — LABORATORY RESULT (OUTPATIENT)
Age: 36
End: 2021-04-30

## 2021-04-30 LAB
BASOPHILS # BLD AUTO: 0.03 K/UL — SIGNIFICANT CHANGE UP (ref 0–0.2)
BASOPHILS NFR BLD AUTO: 0.2 % — SIGNIFICANT CHANGE UP (ref 0–2)
EOSINOPHIL # BLD AUTO: 0.08 K/UL — SIGNIFICANT CHANGE UP (ref 0–0.5)
EOSINOPHIL NFR BLD AUTO: 0.6 % — SIGNIFICANT CHANGE UP (ref 0–6)
HCT VFR BLD CALC: 35.9 % — SIGNIFICANT CHANGE UP (ref 34.5–45)
HGB BLD-MCNC: 12.1 G/DL — SIGNIFICANT CHANGE UP (ref 11.5–15.5)
IMM GRANULOCYTES NFR BLD AUTO: 0.6 % — SIGNIFICANT CHANGE UP (ref 0–1.5)
LYMPHOCYTES # BLD AUTO: 1.33 K/UL — SIGNIFICANT CHANGE UP (ref 1–3.3)
LYMPHOCYTES # BLD AUTO: 10.5 % — LOW (ref 13–44)
MCHC RBC-ENTMCNC: 29.7 PG — SIGNIFICANT CHANGE UP (ref 27–34)
MCHC RBC-ENTMCNC: 33.7 G/DL — SIGNIFICANT CHANGE UP (ref 32–36)
MCV RBC AUTO: 88.2 FL — SIGNIFICANT CHANGE UP (ref 80–100)
MONOCYTES # BLD AUTO: 0.48 K/UL — SIGNIFICANT CHANGE UP (ref 0–0.9)
MONOCYTES NFR BLD AUTO: 3.8 % — SIGNIFICANT CHANGE UP (ref 2–14)
NEUTROPHILS # BLD AUTO: 10.67 K/UL — HIGH (ref 1.8–7.4)
NEUTROPHILS NFR BLD AUTO: 84.3 % — HIGH (ref 43–77)
NRBC # BLD: 0 /100 WBCS — SIGNIFICANT CHANGE UP (ref 0–0)
PLATELET # BLD AUTO: 230 K/UL — SIGNIFICANT CHANGE UP (ref 150–400)
RBC # BLD: 4.07 M/UL — SIGNIFICANT CHANGE UP (ref 3.8–5.2)
RBC # FLD: 12.7 % — SIGNIFICANT CHANGE UP (ref 10.3–14.5)
WBC # BLD: 12.67 K/UL — HIGH (ref 3.8–10.5)
WBC # FLD AUTO: 12.67 K/UL — HIGH (ref 3.8–10.5)

## 2021-05-19 LAB
ANA SER IF-ACNC: NEGATIVE
MISCELLANEOUS TEST: NORMAL
PROC NAME: NORMAL
RHEUMATOID FACT SER QL: <10 IU/ML

## 2021-05-23 ENCOUNTER — OUTPATIENT (OUTPATIENT)
Dept: OUTPATIENT SERVICES | Facility: HOSPITAL | Age: 36
LOS: 1 days | End: 2021-05-23
Payer: COMMERCIAL

## 2021-05-23 VITALS — HEART RATE: 92 BPM | OXYGEN SATURATION: 100 %

## 2021-05-23 VITALS — DIASTOLIC BLOOD PRESSURE: 66 MMHG | TEMPERATURE: 98 F | SYSTOLIC BLOOD PRESSURE: 127 MMHG | HEART RATE: 88 BPM

## 2021-05-23 DIAGNOSIS — O26.899 OTHER SPECIFIED PREGNANCY RELATED CONDITIONS, UNSPECIFIED TRIMESTER: ICD-10-CM

## 2021-05-23 DIAGNOSIS — Z3A.00 WEEKS OF GESTATION OF PREGNANCY NOT SPECIFIED: ICD-10-CM

## 2021-05-23 PROCEDURE — G0463: CPT

## 2021-05-23 PROCEDURE — 59025 FETAL NON-STRESS TEST: CPT | Mod: 26

## 2021-05-23 NOTE — OB PROVIDER TRIAGE NOTE - NSOBPROVIDERNOTE_OBGYN_ALL_OB_FT
The patient is a 34YO  @ 22.4wks presenting w/ episode of VB. No evidence of active bleeding on exam. CL long.   -[place]: No Ctx  -FHR confirmed 140s  -No previa noted  -CL 3.4-3.6 w/o dynamic changes  -No active bleeding noted  -Should monitor for VB at home  -Avoid lifting anything heavy  -PTL precautions given  -Should call the office to schedule an appointment on Tuesday for another ultrasound    Kyle pgy3 d/w Dr. Michel

## 2021-05-23 NOTE — OB PROVIDER TRIAGE NOTE - HISTORY OF PRESENT ILLNESS
The pt is a 36YO  @ 22.4wks presenting w/ episode of VB. The patient said she picked up her daughter earlier this afternoon went to the bathroom and noticed some bleeding. She said she felt a small gush of blood that was bright red and then some small amount after. She wore a panty liner and there was some blood on it, but no large clots. She has not noticed continued bleeding. PNC uncomplicated. Denies Ctx, LOF, VB. +FM    OBHX: c/s- NRFHT  @37wks c/b IUGR (2018), SAB  GYN: Denies  PMH: Factor 5 heterozygote  PSH: c/s  Meds: PNV, Lovenox, ASA  All: Morphine (rash)  Psych: Denies  Soc: Denies  Fhx: Denies

## 2021-05-23 NOTE — OB PROVIDER TRIAGE NOTE - NSHPPHYSICALEXAM_GEN_ALL_CORE
Vital Signs Last 24 Hrs  T(C): 36.7 (23 May 2021 15:19), Max: 36.9 (23 May 2021 15:15)  T(F): 98.1 (23 May 2021 15:19), Max: 98.42 (23 May 2021 15:15)  HR: 92 (23 May 2021 15:56) (79 - 109)  BP: 127/66 (23 May 2021 15:19) (119/85 - 127/66)  BP(mean): --  RR: 18 (23 May 2021 15:19) (18 - 18)  SpO2: 100% (23 May 2021 15:56) (97% - 100%)    Phys:  Gen: NA&, A&Ox3  Abd: Spft, NT, Gravid    TAUS: R. fundal/lateral placenta, no evidence of gross abruption  SSE: 5cc dark blood in vault, cervix closed/long, no active bleeding from os  TVUS: CL: 3.4-3.6 no funneling, no dynamic changes, small clot noted at internal os

## 2021-05-24 ENCOUNTER — NON-APPOINTMENT (OUTPATIENT)
Age: 36
End: 2021-05-24

## 2021-05-24 DIAGNOSIS — D68.51 OTHER DISEASES OF THE BLOOD AND BLOOD-FORMING ORGANS AND CERTAIN DISORDERS INVOLVING THE IMMUNE MECHANISM COMPLICATING PREGNANCY, UNSPECIFIED TRIMESTER: ICD-10-CM

## 2021-05-24 DIAGNOSIS — O99.119 OTHER DISEASES OF THE BLOOD AND BLOOD-FORMING ORGANS AND CERTAIN DISORDERS INVOLVING THE IMMUNE MECHANISM COMPLICATING PREGNANCY, UNSPECIFIED TRIMESTER: ICD-10-CM

## 2021-05-26 ENCOUNTER — OUTPATIENT (OUTPATIENT)
Dept: OUTPATIENT SERVICES | Facility: HOSPITAL | Age: 36
LOS: 1 days | End: 2021-05-26
Payer: COMMERCIAL

## 2021-05-26 VITALS
RESPIRATION RATE: 20 BRPM | HEART RATE: 96 BPM | OXYGEN SATURATION: 100 % | SYSTOLIC BLOOD PRESSURE: 137 MMHG | TEMPERATURE: 98 F | DIASTOLIC BLOOD PRESSURE: 62 MMHG

## 2021-05-26 VITALS — SYSTOLIC BLOOD PRESSURE: 113 MMHG | DIASTOLIC BLOOD PRESSURE: 59 MMHG | HEART RATE: 81 BPM

## 2021-05-26 DIAGNOSIS — O26.899 OTHER SPECIFIED PREGNANCY RELATED CONDITIONS, UNSPECIFIED TRIMESTER: ICD-10-CM

## 2021-05-26 DIAGNOSIS — O09.90 SUPERVISION OF HIGH RISK PREGNANCY, UNSPECIFIED, UNSPECIFIED TRIMESTER: ICD-10-CM

## 2021-05-26 DIAGNOSIS — Z3A.00 WEEKS OF GESTATION OF PREGNANCY NOT SPECIFIED: ICD-10-CM

## 2021-05-26 PROBLEM — O99.119 FACTOR V LEIDEN MUTATION AFFECTING PREGNANCY: Status: ACTIVE | Noted: 2020-07-29

## 2021-05-26 PROCEDURE — 59025 FETAL NON-STRESS TEST: CPT | Mod: 26

## 2021-05-26 PROCEDURE — G0463: CPT

## 2021-05-26 PROCEDURE — 59025 FETAL NON-STRESS TEST: CPT

## 2021-05-26 NOTE — OB PROVIDER TRIAGE NOTE - HISTORY OF PRESENT ILLNESS
**INCOMPLETE**  GLORIA HORAN is a 35y G*P*      Pt endorses ***fetal movement, denies any contractions, vaginal bleeding or leaking of fluid.     OB/GYN HISTORY:     LMP  G P   Pt denies any hx of fibroids, endometriosis, known ovarian cyst, STIs or abnormal pap smears                                            REVIEW OF SYSTEMS:  CONSTITUTIONAL: No weakness, fevers or chills  EYES/ENT: No visual changes  NECK: No pain or stiffness  RESPIRATORY: No cough, wheezing; No shortness of breath  CARDIOVASCULAR: No chest pain or palpitations  GASTROINTESTINAL: No abdominal or epigastric pain. No nausea, vomiting; No diarrhea or constipation  GENITOURINARY: No dysuria, frequency or hematuria  NEUROLOGICAL: No headache, LOC  All other review of systems is negative unless indicated above      Allergies    No Known Allergies    Intolerances        PAST MEDICAL & SURGICAL HISTORY:  Factor 5 Leiden mutation, heterozygous     delivery delivered            FAMILY HISTORY:  No pertinent family history in first degree relatives        SOCIAL HISTORY:    Vital Signs Last 24 Hrs  T(C): 36.8 (26 May 2021 19:46), Max: 36.8 (26 May 2021 19:38)  T(F): 98.24 (26 May 2021 19:46), Max: 98.24 (26 May 2021 19:46)  HR: 93 (26 May 2021 20:41) (76 - 96)  BP: 110/55 (26 May 2021 20:17) (110/55 - 137/62)  BP(mean): --  RR: 20 (26 May 2021 19:46) (20 - 20)  SpO2: 100% (26 May 2021 20:46) (100% - 100%)    PHYSICAL EXAM:  Constitutional: NAD, awake and alert, well-developed  HEENT: Normal Hearing,  Neck: Soft and supple  Respiratory: Breath sounds are clear bilaterally, No wheezing, rales or rhonchi  Cardiovascular: S1 and S2, regular rate and rhythm, no Murmurs, gallops or rubs  Gastrointestinal: Bowel Sounds present, soft, nontender, nondistended, no guarding, no rebound  Genitourinary: vaginal bleeding***, cervical os ***, nontender on bimanual exam    Extremities: No peripheral edema  Vascular: 2+ peripheral pulses  Neurological: A/O x 3, no focal deficits   GLORIA HORAN is a 35y  at 23w who presents to triage following a repeated episode of vaginal bleeding. She was recently seen in triage  for a similar complaint and was found to have no underlying etiology to explain her symptoms. PMH notable for factor V heterozygote and hx of severe IUGR with abnormal placentation; she is taking ASA 81mg and Lovenox 40mg QD and she is managed by Heme (seen today with no further intervention indicated). She report gusg of bright red blood followed by expression of a few dime sized clots and spotting of bright red/brown blood since 1300 today. She denies any heavy vaginal bleeding, light headedness, dizziness, SOB, CP, or abdominal pain. Pt reports fetal movement, denies any contractions or leaking of fluid.     OB/GYN HISTORY:     CHERYL: 21   (SAB 2020, pLTCS 2018 - severe IUGR ~ 4lbs, failed IOL)  Pt denies any hx of fibroids, endometriosis, known ovarian cyst, STIs or abnormal pap smears                                            REVIEW OF SYSTEMS:  CONSTITUTIONAL: No weakness, fevers or chills  EYES/ENT: No visual changes  RESPIRATORY: No shortness of breath  CARDIOVASCULAR: No chest pain or palpitations  GASTROINTESTINAL: No abdominal or epigastric pain. No nausea, vomiting; No diarrhea or constipation  GENITOURINARY: No dysuria or frequency   NEUROLOGICAL: No headache, LOC  All other review of systems is negative unless indicated above      Allergies  Morphine: lip rash and swelling         PAST MEDICAL & SURGICAL HISTORY:  Factor 5 Leiden mutation, heterozygous   delivery delivered        FAMILY HISTORY:  Mother - Factor V Leiden heterozygote      SOCIAL HISTORY:  Lives with  and child  denies any alcohol/tobacco/illicit drug use   Vital Signs Last 24 Hrs  T(C): 36.8 (26 May 2021 19:46), Max: 36.8 (26 May 2021 19:38)  T(F): 98.24 (26 May 2021 19:46), Max: 98.24 (26 May 2021 19:46)  HR: 93 (26 May 2021 20:41) (76 - 96)  BP: 110/55 (26 May 2021 20:17) (110/55 - 137/62)  BP(mean): --  RR: 20 (26 May 2021 19:46) (20 - 20)  SpO2: 100% (26 May 2021 20:46) (100% - 100%)    PHYSICAL EXAM:  Constitutional: NAD, awake and alert, well-developed  HEENT: Normal Hearing  Respiratory: Breath sounds are clear bilaterally  Cardiovascular: S1 and S2, regular rate and rhythm, no Murmurs, gallops or rubs  Gastrointestinal: gravid, soft, nontender, nondistended, no guarding, no rebound  Genitourinary: 5cc of dark brown blood, no bright red blood, no vaginal bleeding, cervical os closed, nontender on bimanual exam    Extremities: No peripheral edema  Neurological: A/O x 3, no focal deficits    EFM:   Brave: no contractions   TAUS: fetal movement appreciated, fetal heart rate visualized, fundal placenta, breech presentation, HARMEET wnl, ?fundal subchorionic hemorrhage     TVUS: CL 4.1cm

## 2021-05-26 NOTE — REASON FOR VISIT
[Home] : at home, [unfilled] , at the time of the visit. [Other Location: e.g. Home (Enter Location, City,State)___] : at [unfilled] [Verbal consent obtained from patient] : the patient, [unfilled] [Follow-Up Visit] : a follow-up visit for

## 2021-05-26 NOTE — ASSESSMENT
[FreeTextEntry1] : This is a 35 year old female with a history of heterozygous FVL with  IUGR in her first pregnancy in 2018 currently 21 weeks pregnant .  In 2018, baby also had thrombocytopenia at birth where platelet wing on day 3 was 29k and was treated on day4 with IVIG and platelets with immediate improvement.\par \par She is taking lovenox and aspirin for IUGR and placental findings in the setting of heterozygous factor V leiden and tolerating this well.\par \par Re the NAIT: testing has been sent. Maternal serum tested initially showed no antibodies. No intervention taken at that time. This was discussed with patient and decided to test maternal and paternal serum together. This was sent to [place] and resulted. \par \par Entire testing only revealed HLA antibodies not HPA antibodies. HLA is present in 35% of women and most of them will react when encountering paternal serum.  Generally not concerning for NAIT, however, there are scant case reports of NAIT in this setting. \par \par  Usually when there is reactivity and HLA is causing NAIT the value is in the 1000's, Ms Feng's reactive number is 100 (cut off is 5 for positivity).  It seems likely that the baby's thrombocytopenia (along with polycythemia) may be more related to the IUGR. Given the severity of thrombocytopenia in this case, could consider initiation of IVIG, however will discuss with MFM and possibly neonatology given the low probability given the current results.  \par  \par  Further testing reveals that she also does not have any autoantibodies making ITP from mother to baby less likey. Mother's platelet count was mildly low upon delivery.

## 2021-05-26 NOTE — OB PROVIDER TRIAGE NOTE - NSOBPROVIDERNOTE_OBGYN_ALL_OB_FT
GLORIA HORAN is a 35y  at 23w who presented to triage following a repeated episode of vaginal bleeding (intermittent episodes  - present) which has now ceased. Physical exam unremarkable with no active bleeding, no ctx, vital signs wnl, and ultrasound with evidence of fundal placenta and ?subchorionic hemorrhage - no evidence of placenta/vasa previa or PTL, and CL long (4.1cm) and closed. Pt is clinically and hemodynamically stable. She was given precautions of when to return to ED and advised to follow up in in clinic at next prenatal appointment 21.       Pt discussed with / Gordon.    Sandi Boyd MD  OBGYN PGY2 GLORIA HORAN is a 35y  at 23w who presented to triage following a repeated episode of vaginal bleeding (intermittent episodes  - present) which has now ceased. Physical exam unremarkable with no active bleeding, no ctx, vital signs wnl, and ultrasound with evidence of fundal placenta and ?subchorionic hemorrhage - no evidence of placenta/vasa previa or PTL, and CL long (4.1cm) and closed. Pt is clinically and hemodynamically stable. She was given precautions of when to return to ED and advised to follow up in clinic at next prenatal appointment 21.       Pt discussed with / Gordon.    Sandi Boyd MD  OBGYN PGY2 GLORIA HORAN is a 35y  at 23w who presented to triage following a repeated episode of vaginal bleeding (intermittent episodes  - present) which has now ceased. Physical exam unremarkable with no active bleeding, no ctx, vital signs wnl, and ultrasound with evidence of fundal placenta and ?subchorionic hemorrhage - no evidence of placenta/vasa previa or PTL, and CL long (4.1cm) and closed. Pt is clinically and hemodynamically stable. She was given precautions of when to return to ED/OB Triage and advised to follow up in clinic at next prenatal appointment 21.       Pt discussed with / Gordon.    Sandi Boyd MD  OBGYN PGY2

## 2021-05-26 NOTE — HISTORY OF PRESENT ILLNESS
[Disease:__________________________] : Disease: [unfilled] [de-identified] : This is a 34 year old female here for follow up of heterozygous FVL in the setting of IUGR as weel as thrombocytopenia in her , now preconception planning. \par \par She had a vilamentous cord insertion and had IUGR during her pregnancy. She was delivered at 37 weeks by c section, baby was very small- was approximately 3.5lbs.  Placenta was examined and was found to have  focal markedly increased intervillous fibrin deposition associated with peripheral infarct, and diffusely increased maternal plate calcifications.  \par \par Additionally, at birth patient had low platelets, requiring platelet transfusion,  she had HPA antigen testing but never was able to get the result from [facility]. \par Delivered at [facility], but now on [place], will establish care with OB on .\par \par She reports she has had no personal history of clotting. She did not describe any miscarraiges .She is thinking about trying to conceive again and wants to ensure no hypercoagulable state. Also wants to understand more about the thrombocytipenia  requiring transfusion that her daughter had.\par \par She denies any increased bleeding or bruising. \par  [de-identified] : Patient called office when she was pregnant regarding further management. She has been placed on lovenox in the setting of the IUGR and the heterozygoud factor V leiden.\par \par In regards to the possibe question of NAIT:\par \par Patients daugther Frieda was born with significant iUGR and delivered at 37 weeks  and had a c/section, baby was 3lb 15oz.  Baby was in the NICU x 11 days. Delivered on 8/13/18.  Platelets was 44k on 8/13, 59k 8/14 and 29K on 8/15. Baby received platelets and IVIG on 8/16 and platelets subsequently recovered.  \par \par I have sent the maternal and paternal blood to [place] and am calling nena now to discuss the results.\par She is feeling well in this pregnnacy and is tolerating the lovenox and aspirin well.

## 2021-05-27 ENCOUNTER — NON-APPOINTMENT (OUTPATIENT)
Age: 36
End: 2021-05-27

## 2021-05-27 PROBLEM — D68.51 ACTIVATED PROTEIN C RESISTANCE: Chronic | Status: ACTIVE | Noted: 2021-05-23

## 2021-05-30 ENCOUNTER — OUTPATIENT (OUTPATIENT)
Dept: OUTPATIENT SERVICES | Facility: HOSPITAL | Age: 36
LOS: 1 days | End: 2021-05-30
Payer: COMMERCIAL

## 2021-05-30 ENCOUNTER — OUTPATIENT (OUTPATIENT)
Dept: INPATIENT UNIT | Facility: HOSPITAL | Age: 36
LOS: 1 days | End: 2021-05-30
Payer: COMMERCIAL

## 2021-05-30 VITALS — SYSTOLIC BLOOD PRESSURE: 107 MMHG | DIASTOLIC BLOOD PRESSURE: 55 MMHG | HEART RATE: 90 BPM

## 2021-05-30 VITALS
SYSTOLIC BLOOD PRESSURE: 106 MMHG | HEART RATE: 84 BPM | TEMPERATURE: 98 F | DIASTOLIC BLOOD PRESSURE: 55 MMHG | RESPIRATION RATE: 18 BRPM

## 2021-05-30 VITALS
RESPIRATION RATE: 19 BRPM | TEMPERATURE: 99 F | DIASTOLIC BLOOD PRESSURE: 69 MMHG | SYSTOLIC BLOOD PRESSURE: 114 MMHG | HEART RATE: 96 BPM

## 2021-05-30 VITALS — TEMPERATURE: 98 F

## 2021-05-30 DIAGNOSIS — O47.02 FALSE LABOR BEFORE 37 COMPLETED WEEKS OF GESTATION, SECOND TRIMESTER: ICD-10-CM

## 2021-05-30 DIAGNOSIS — O26.899 OTHER SPECIFIED PREGNANCY RELATED CONDITIONS, UNSPECIFIED TRIMESTER: ICD-10-CM

## 2021-05-30 DIAGNOSIS — Z3A.00 WEEKS OF GESTATION OF PREGNANCY NOT SPECIFIED: ICD-10-CM

## 2021-05-30 LAB
APPEARANCE UR: CLEAR — SIGNIFICANT CHANGE UP
APTT BLD: 26.2 SEC — LOW (ref 27.5–35.5)
BACTERIA # UR AUTO: NEGATIVE — SIGNIFICANT CHANGE UP
BILIRUB UR-MCNC: NEGATIVE — SIGNIFICANT CHANGE UP
BLD GP AB SCN SERPL QL: SIGNIFICANT CHANGE UP
COLOR SPEC: YELLOW — SIGNIFICANT CHANGE UP
DIFF PNL FLD: ABNORMAL
EPI CELLS # UR: SIGNIFICANT CHANGE UP
GLUCOSE UR QL: NEGATIVE MG/DL — SIGNIFICANT CHANGE UP
HCT VFR BLD CALC: 34 % — LOW (ref 34.5–45)
HGB BLD-MCNC: 11.3 G/DL — LOW (ref 11.5–15.5)
INR BLD: 1.01 RATIO — SIGNIFICANT CHANGE UP (ref 0.88–1.16)
KETONES UR-MCNC: NEGATIVE — SIGNIFICANT CHANGE UP
KLEIHAUER-BETKE CALCULATION: 0 % — SIGNIFICANT CHANGE UP (ref 0–0.3)
LEUKOCYTE ESTERASE UR-ACNC: NEGATIVE — SIGNIFICANT CHANGE UP
MCHC RBC-ENTMCNC: 30.1 PG — SIGNIFICANT CHANGE UP (ref 27–34)
MCHC RBC-ENTMCNC: 33.2 GM/DL — SIGNIFICANT CHANGE UP (ref 32–36)
MCV RBC AUTO: 90.4 FL — SIGNIFICANT CHANGE UP (ref 80–100)
NITRITE UR-MCNC: NEGATIVE — SIGNIFICANT CHANGE UP
PH UR: 7 — SIGNIFICANT CHANGE UP (ref 5–8)
PLATELET # BLD AUTO: 222 K/UL — SIGNIFICANT CHANGE UP (ref 150–400)
PROT UR-MCNC: NEGATIVE MG/DL — SIGNIFICANT CHANGE UP
PROTHROM AB SERPL-ACNC: 11.7 SEC — SIGNIFICANT CHANGE UP (ref 10.6–13.6)
RBC # BLD: 3.76 M/UL — LOW (ref 3.8–5.2)
RBC # FLD: 12.8 % — SIGNIFICANT CHANGE UP (ref 10.3–14.5)
RBC CASTS # UR COMP ASSIST: ABNORMAL /HPF (ref 0–4)
SP GR SPEC: 1 — LOW (ref 1.01–1.02)
UROBILINOGEN FLD QL: NEGATIVE MG/DL — SIGNIFICANT CHANGE UP
WBC # BLD: 14.87 K/UL — HIGH (ref 3.8–10.5)
WBC # FLD AUTO: 14.87 K/UL — HIGH (ref 3.8–10.5)
WBC UR QL: SIGNIFICANT CHANGE UP

## 2021-05-30 PROCEDURE — 81001 URINALYSIS AUTO W/SCOPE: CPT

## 2021-05-30 PROCEDURE — G0463: CPT

## 2021-05-30 PROCEDURE — 86850 RBC ANTIBODY SCREEN: CPT

## 2021-05-30 PROCEDURE — 85730 THROMBOPLASTIN TIME PARTIAL: CPT

## 2021-05-30 PROCEDURE — 36415 COLL VENOUS BLD VENIPUNCTURE: CPT

## 2021-05-30 PROCEDURE — 87086 URINE CULTURE/COLONY COUNT: CPT

## 2021-05-30 PROCEDURE — 86900 BLOOD TYPING SEROLOGIC ABO: CPT

## 2021-05-30 PROCEDURE — 85027 COMPLETE CBC AUTOMATED: CPT

## 2021-05-30 PROCEDURE — 86901 BLOOD TYPING SEROLOGIC RH(D): CPT

## 2021-05-30 PROCEDURE — 59025 FETAL NON-STRESS TEST: CPT | Mod: 26

## 2021-05-30 PROCEDURE — 59025 FETAL NON-STRESS TEST: CPT

## 2021-05-30 PROCEDURE — 85460 HEMOGLOBIN FETAL: CPT

## 2021-05-30 PROCEDURE — 85610 PROTHROMBIN TIME: CPT

## 2021-05-30 RX ORDER — SODIUM CHLORIDE 9 MG/ML
1000 INJECTION, SOLUTION INTRAVENOUS ONCE
Refills: 0 | Status: COMPLETED | OUTPATIENT
Start: 2021-05-30 | End: 2021-05-30

## 2021-05-30 RX ADMIN — SODIUM CHLORIDE 1000 MILLILITER(S): 9 INJECTION, SOLUTION INTRAVENOUS at 10:00

## 2021-05-30 NOTE — OB RN TRIAGE NOTE - PMH
Factor 5 Leiden mutation, heterozygous    Mild intermittent asthma without complication  (Last used inhaler Feb 2021)   Factor 5 Leiden mutation, heterozygous    Mild intermittent asthma without complication  (Last used inhaler Feb 2021)  Miscarriage  x 1 (2020)

## 2021-05-30 NOTE — OB PROVIDER TRIAGE NOTE - HISTORY OF PRESENT ILLNESS
34yo  at 23w4d presents due to passing multiple clots over the past 24 hours. 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 States that when she goes to sit down to pee, she feels the blood continuing to trickle. Reports some periumbilical pain but denies cramping, vaginal discharge, dysuria or changes in bowel habits. Endorses fetal movement.   Patient states she has a history of 2nd trimester bleeding during this pregnancy - she was seen last week on  at [facility] and was told she has a clot behind her cervix. Otherwise, workup was unremarkable.     Pregnancy complicated by 2nd trimester bleeding and Factor 5 Heterozygote on Lovenox 40mg qd and ASA81 qd.     POBHx: 2018, 37w, pCS due to NRFHT, complicated by severe IUGR and abnormal placentation, 3lbs. SAB x1  PMHx/PShx: CS, Factor 5 Heterozygote  Meds: PNVs, Lovenox 40mg qd, ASA81 wd  ALL: Morphine (Rash)  SocHx: denies tobacco, alcohol or drug use.

## 2021-05-30 NOTE — OB PROVIDER TRIAGE NOTE - PMH
Factor 5 Leiden mutation, heterozygous    Mild intermittent asthma without complication  (Last used inhaler Feb 2021)  Miscarriage  x 1 (2020)

## 2021-05-30 NOTE — OB PROVIDER TRIAGE NOTE - HISTORY OF PRESENT ILLNESS
The patient is a 35y  at 23w who presents to triage following a repeated episode of vaginal bleeding. She was seen a [facility] today after working overnight for bleeding. She said she had passed a few clots that were dark red and some bright red blood mixed in. At [place], her exam workup for PTL was negative. She had no significant vaginal bleeding and CL was long. She said she felt some cramping after her evaluation, but she said that otherwise she was asymptomatic. Denies LOF, consistent ctx, HA, lightheadedness, dizziness, nausea/vomiting, abd pain, urinary/bowel symptoms. +FM    PNC: Vaginal bleeding  OBHX: c/s- NRFHT  @37wks c/b IUGR (2018), SAB  GYN: Denies  PMH: Factor 5 heterozygote  PSH: c/s  Meds: PNV, Lovenox 40QD, ASA  All: Morphine (rash)  Psych: Denies  Soc: Denies  Fhx: Denies

## 2021-05-30 NOTE — OB PROVIDER TRIAGE NOTE - NSHPPHYSICALEXAM_GEN_ALL_CORE
Vital Signs Last 24 Hrs  T(C): 36.8 (30 May 2021 14:03), Max: 37 (30 May 2021 09:17)  T(F): 98.2 (30 May 2021 14:03), Max: 98.6 (30 May 2021 09:17)  HR: 88 (30 May 2021 15:09) (78 - 104)  BP: 107/58 (30 May 2021 14:03) (107/55 - 114/69)  BP(mean): --  RR: 18 (30 May 2021 14:03) (16 - 19)  SpO2: 100% (30 May 2021 15:09) (87% - 100%)    EFM: +FHR  Chalmers: Ctx Q 2-6min    TAUS: Breech, HARMEET wnl, fundal placenta, Small possible area of abruption vs. subchorionic hematoma  SSE: CLosed/long/high, 5-10cc dark blood in vault, no active beleding from cervix  TVUS: CL: 3.6-4.0 no dynamic changes/funneling

## 2021-05-30 NOTE — OB PROVIDER TRIAGE NOTE - NSOBPROVIDERNOTE_OBGYN_ALL_OB_FT
The patient is a 36YO  @ 23.4wks presenting w/ multiple episodes of vaginal bleeding. Possible chronic abruption, but no heavy bleeding on SSE. CL long. Cervix closed w/o active bleeding from os.  -Monitor VS  -H/H stable this AM  -RH+  -FHR visualized  -Pt unlikely in PTL  -Small area concerning for possible chronic abruption vs. subchorionic hematoma visualized on TAUS  -Home w/ PTL precautions   -f/u in office Wednesday for ultrasound    Kyle pgy3 d/w Dr. Leyva The patient is a 36YO  @ 23.4wks presenting w/ multiple episodes of vaginal bleeding. Possible chronic abruption, but no heavy bleeding on SSE. CL long. Cervix closed w/o active bleeding from os.  -Monitor VS  -H/H stable this AM  -RH+  -FHR visualized  -Pt unlikely in PTL  -Small area concerning for possible chronic abruption vs. subchorionic hematoma visualized on TAUS  -Home w/ PTL precautions   -f/u in office Wednesday for ultrasound  -Pt would not like intervention at this gestational age. She was counseled on the possibility of abruption at this gestational age. 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    Kyle pgy3 d/w Dr. Leyva

## 2021-05-30 NOTE — OB PROVIDER TRIAGE NOTE - NSHPPHYSICALEXAM_GEN_ALL_CORE
Vital Signs Last 24 Hrs  HR: 96 (30 May 2021 09:23) (96 - 96)  BP: 114/69 (30 May 2021 09:23) (114/69 - 114/69)  RR: 19 (30 May 2021 09:17) (19 - 19)    Gen: NAD, AOx3  Abd: Soft, nontender, non-distended. Gravid.   SSE:  TAUS:  TVUS: Vital Signs Last 24 Hrs  HR: 96 (30 May 2021 09:23) (96 - 96)  BP: 114/69 (30 May 2021 09:23) (114/69 - 114/69)  RR: 19 (30 May 2021 09:17) (19 - 19)    Gen: NAD, AOx3  Abd: Soft, nontender, non-distended. Gravid.   SSE: ~5cc old blood in vaginal vault, no active vaginal bleeding, no pooling  TAUS: Gross fetal movement and tone, MVP >5cm, no sonographic evidence of abruption, vertex presentation, fundal/anterior placenta  TVUS: CL between 4.5-5.1cm, minimal blood in cervical canal, no active changes, no funneling  SVE: FT/50/-3

## 2021-05-30 NOTE — OB PROVIDER TRIAGE NOTE - NSOBPROVIDERNOTE_OBGYN_ALL_OB_FT
34yo  at 23w4d w/ Factor 5 Het on Lovenox and ASA here with  labor vs. placental abruption. FHT reassuring.   - CBC, Coags, T&S, KB, UA, UC  - Affirm, GBS  - TA/TVUS    d/w Dr. Reeder 36yo  at 23w4d w/ Factor 5 Het on Lovenox and ASA here with  labor vs. placental abruption. FHT reassuring.   - CBC, Coags, T&S, KB, UA, UC  - Affirm, GBS  - TA/TVUS    d/w Dr. Reeder    ADDENDUM:  Sonogram performed at bedside with Dr. Reeder. Cervix long and closed, CL 4.5-5.1cm. No funneling or dynamic changes. TAUS without any sonographic evidence of gross placental abruption. No evidence of active vaginal bleeding on speculum exam. Fetal status reassuring. Contractions have spaced out.   Advised patient to follow up with MFM this week to discuss continuation of Lovenox given repeated episodes of vaginal bleeding.   Will continue to monitor on toco    d/w Dr. Reeder 34yo  at 23w4d w/ Factor 5 Het on Lovenox and ASA here with  labor vs. placental abruption. FHT reassuring.   - CBC, Coags, T&S, KB, UA, UC  - Affirm, GBS  - TA/TVUS    d/w Dr. Reeder    ADDENDUM:  Sonogram performed at bedside with Dr. Reeder. Cervix long and closed, CL 4.5-5.1cm. No funneling or dynamic changes. TAUS without any sonographic evidence of gross placental abruption. No evidence of active vaginal bleeding on speculum exam. Fetal status reassuring. Contractions have spaced out.   Advised patient to follow up with MFM this week to discuss continuation of Lovenox given repeated episodes of vaginal bleeding.   Will continue to monitor on toco    d/w Dr. Reeder      Addendum by Dr HOUSTON Reeder:  discussed with pt reassuring exam and sono, but continuation of CTXs Q5-6min.  Discussed plan for continued monitoring/observation, also discussed possible transfer to [place].  Both pt's physician and  are at [place] and pt opts to drive there right now, declines ambulance transfer.  She agrees to go straight there.

## 2021-06-01 LAB
CULTURE RESULTS: SIGNIFICANT CHANGE UP
SPECIMEN SOURCE: SIGNIFICANT CHANGE UP

## 2021-06-02 ENCOUNTER — APPOINTMENT (OUTPATIENT)
Dept: MATERNAL FETAL MEDICINE | Facility: CLINIC | Age: 36
End: 2021-06-02
Payer: COMMERCIAL

## 2021-06-02 ENCOUNTER — APPOINTMENT (OUTPATIENT)
Dept: ANTEPARTUM | Facility: CLINIC | Age: 36
End: 2021-06-02
Payer: COMMERCIAL

## 2021-06-02 ENCOUNTER — ASOB RESULT (OUTPATIENT)
Age: 36
End: 2021-06-02

## 2021-06-02 VITALS
HEART RATE: 84 BPM | OXYGEN SATURATION: 99 % | SYSTOLIC BLOOD PRESSURE: 98 MMHG | BODY MASS INDEX: 21.85 KG/M2 | WEIGHT: 128 LBS | HEIGHT: 64 IN | DIASTOLIC BLOOD PRESSURE: 60 MMHG

## 2021-06-02 PROCEDURE — 99072 ADDL SUPL MATRL&STAF TM PHE: CPT

## 2021-06-02 PROCEDURE — 76805 OB US >/= 14 WKS SNGL FETUS: CPT

## 2021-06-02 PROCEDURE — 99204 OFFICE O/P NEW MOD 45 MIN: CPT | Mod: 25

## 2021-08-05 ENCOUNTER — APPOINTMENT (OUTPATIENT)
Dept: ANTEPARTUM | Facility: CLINIC | Age: 36
End: 2021-08-05

## 2021-08-05 ENCOUNTER — ASOB RESULT (OUTPATIENT)
Age: 36
End: 2021-08-05

## 2021-08-05 ENCOUNTER — INPATIENT (INPATIENT)
Facility: HOSPITAL | Age: 36
LOS: 6 days | Discharge: HOME | End: 2021-08-12
Attending: OBSTETRICS & GYNECOLOGY | Admitting: OBSTETRICS & GYNECOLOGY
Payer: COMMERCIAL

## 2021-08-05 VITALS
TEMPERATURE: 99 F | RESPIRATION RATE: 18 BRPM | SYSTOLIC BLOOD PRESSURE: 130 MMHG | DIASTOLIC BLOOD PRESSURE: 69 MMHG | OXYGEN SATURATION: 100 % | HEART RATE: 99 BPM

## 2021-08-05 DIAGNOSIS — Z3A.00 WEEKS OF GESTATION OF PREGNANCY NOT SPECIFIED: ICD-10-CM

## 2021-08-05 DIAGNOSIS — Z34.80 ENCOUNTER FOR SUPERVISION OF OTHER NORMAL PREGNANCY, UNSPECIFIED TRIMESTER: ICD-10-CM

## 2021-08-05 DIAGNOSIS — O26.899 OTHER SPECIFIED PREGNANCY RELATED CONDITIONS, UNSPECIFIED TRIMESTER: ICD-10-CM

## 2021-08-05 PROBLEM — J45.20 MILD INTERMITTENT ASTHMA, UNCOMPLICATED: Chronic | Status: ACTIVE | Noted: 2021-05-30

## 2021-08-05 PROBLEM — O03.9 COMPLETE OR UNSPECIFIED SPONTANEOUS ABORTION WITHOUT COMPLICATION: Chronic | Status: ACTIVE | Noted: 2021-05-30

## 2021-08-05 LAB
BASOPHILS # BLD AUTO: 0.04 K/UL — SIGNIFICANT CHANGE UP (ref 0–0.2)
BASOPHILS NFR BLD AUTO: 0.3 % — SIGNIFICANT CHANGE UP (ref 0–2)
COVID-19 SPIKE DOMAIN AB INTERP: POSITIVE
COVID-19 SPIKE DOMAIN ANTIBODY RESULT: >250 U/ML — HIGH
EOSINOPHIL # BLD AUTO: 0.15 K/UL — SIGNIFICANT CHANGE UP (ref 0–0.5)
EOSINOPHIL NFR BLD AUTO: 1.2 % — SIGNIFICANT CHANGE UP (ref 0–6)
HCT VFR BLD CALC: 32.8 % — LOW (ref 34.5–45)
HGB BLD-MCNC: 10.8 G/DL — LOW (ref 11.5–15.5)
IMM GRANULOCYTES NFR BLD AUTO: 0.9 % — SIGNIFICANT CHANGE UP (ref 0–1.5)
LYMPHOCYTES # BLD AUTO: 17.4 % — SIGNIFICANT CHANGE UP (ref 13–44)
LYMPHOCYTES # BLD AUTO: 2.17 K/UL — SIGNIFICANT CHANGE UP (ref 1–3.3)
MCHC RBC-ENTMCNC: 28.8 PG — SIGNIFICANT CHANGE UP (ref 27–34)
MCHC RBC-ENTMCNC: 32.9 GM/DL — SIGNIFICANT CHANGE UP (ref 32–36)
MCV RBC AUTO: 87.5 FL — SIGNIFICANT CHANGE UP (ref 80–100)
MONOCYTES # BLD AUTO: 0.95 K/UL — HIGH (ref 0–0.9)
MONOCYTES NFR BLD AUTO: 7.6 % — SIGNIFICANT CHANGE UP (ref 2–14)
NEUTROPHILS # BLD AUTO: 9.08 K/UL — HIGH (ref 1.8–7.4)
NEUTROPHILS NFR BLD AUTO: 72.6 % — SIGNIFICANT CHANGE UP (ref 43–77)
NRBC # BLD: 0 /100 WBCS — SIGNIFICANT CHANGE UP (ref 0–0)
PLATELET # BLD AUTO: 163 K/UL — SIGNIFICANT CHANGE UP (ref 150–400)
RBC # BLD: 3.75 M/UL — LOW (ref 3.8–5.2)
RBC # FLD: 12.6 % — SIGNIFICANT CHANGE UP (ref 10.3–14.5)
SARS-COV-2 IGG+IGM SERPL QL IA: >250 U/ML — HIGH
SARS-COV-2 IGG+IGM SERPL QL IA: POSITIVE
SARS-COV-2 RNA SPEC QL NAA+PROBE: SIGNIFICANT CHANGE UP
WBC # BLD: 12.5 K/UL — HIGH (ref 3.8–10.5)
WBC # FLD AUTO: 12.5 K/UL — HIGH (ref 3.8–10.5)

## 2021-08-05 RX ORDER — SODIUM CHLORIDE 9 MG/ML
1000 INJECTION, SOLUTION INTRAVENOUS
Refills: 0 | Status: DISCONTINUED | OUTPATIENT
Start: 2021-08-05 | End: 2021-08-09

## 2021-08-05 RX ORDER — AMPICILLIN TRIHYDRATE 250 MG
CAPSULE ORAL
Refills: 0 | Status: DISCONTINUED | OUTPATIENT
Start: 2021-08-05 | End: 2021-08-05

## 2021-08-05 RX ORDER — AMOXICILLIN 250 MG/5ML
500 SUSPENSION, RECONSTITUTED, ORAL (ML) ORAL EVERY 8 HOURS
Refills: 0 | Status: DISCONTINUED | OUTPATIENT
Start: 2021-08-05 | End: 2021-08-05

## 2021-08-05 RX ORDER — NIFEDIPINE 30 MG
10 TABLET, EXTENDED RELEASE 24 HR ORAL
Refills: 0 | Status: COMPLETED | OUTPATIENT
Start: 2021-08-05 | End: 2021-08-05

## 2021-08-05 RX ORDER — AMPICILLIN TRIHYDRATE 250 MG
2 CAPSULE ORAL EVERY 6 HOURS
Refills: 0 | Status: DISCONTINUED | OUTPATIENT
Start: 2021-08-05 | End: 2021-08-06

## 2021-08-05 RX ORDER — AMPICILLIN TRIHYDRATE 250 MG
2 CAPSULE ORAL ONCE
Refills: 0 | Status: COMPLETED | OUTPATIENT
Start: 2021-08-05 | End: 2021-08-05

## 2021-08-05 RX ORDER — ERYTHROMYCIN ETHYLSUCCINATE 400 MG
TABLET ORAL
Refills: 0 | Status: DISCONTINUED | OUTPATIENT
Start: 2021-08-05 | End: 2021-08-05

## 2021-08-05 RX ORDER — HEPARIN SODIUM 5000 [USP'U]/ML
5000 INJECTION INTRAVENOUS; SUBCUTANEOUS EVERY 12 HOURS
Refills: 0 | Status: DISCONTINUED | OUTPATIENT
Start: 2021-08-05 | End: 2021-08-06

## 2021-08-05 RX ORDER — ERYTHROMYCIN ETHYLSUCCINATE 400 MG
250 TABLET ORAL EVERY 6 HOURS
Refills: 0 | Status: DISCONTINUED | OUTPATIENT
Start: 2021-08-05 | End: 2021-08-05

## 2021-08-05 RX ORDER — AMOXICILLIN 250 MG/5ML
500 SUSPENSION, RECONSTITUTED, ORAL (ML) ORAL EVERY 8 HOURS
Refills: 0 | Status: COMPLETED | OUTPATIENT
Start: 2021-08-05

## 2021-08-05 RX ORDER — ERYTHROMYCIN ETHYLSUCCINATE 400 MG
250 TABLET ORAL ONCE
Refills: 0 | Status: DISCONTINUED | OUTPATIENT
Start: 2021-08-05 | End: 2021-08-05

## 2021-08-05 RX ORDER — NIFEDIPINE 30 MG
10 TABLET, EXTENDED RELEASE 24 HR ORAL EVERY 6 HOURS
Refills: 0 | Status: DISCONTINUED | OUTPATIENT
Start: 2021-08-05 | End: 2021-08-05

## 2021-08-05 RX ORDER — ERYTHROMYCIN ETHYLSUCCINATE 400 MG
250 TABLET ORAL EVERY 8 HOURS
Refills: 0 | Status: DISCONTINUED | OUTPATIENT
Start: 2021-08-05 | End: 2021-08-05

## 2021-08-05 RX ORDER — AMPICILLIN TRIHYDRATE 250 MG
2 CAPSULE ORAL EVERY 6 HOURS
Refills: 0 | Status: DISCONTINUED | OUTPATIENT
Start: 2021-08-05 | End: 2021-08-05

## 2021-08-05 RX ORDER — AMOXICILLIN 250 MG/5ML
500 SUSPENSION, RECONSTITUTED, ORAL (ML) ORAL EVERY 8 HOURS
Refills: 0 | Status: DISCONTINUED | OUTPATIENT
Start: 2021-08-05 | End: 2021-08-06

## 2021-08-05 RX ORDER — AZITHROMYCIN 500 MG/1
500 TABLET, FILM COATED ORAL EVERY 24 HOURS
Refills: 0 | Status: COMPLETED | OUTPATIENT
Start: 2021-08-05 | End: 2021-08-05

## 2021-08-05 RX ADMIN — Medication 116 GRAM(S): at 12:04

## 2021-08-05 RX ADMIN — AZITHROMYCIN 250 MILLIGRAM(S): 500 TABLET, FILM COATED ORAL at 07:40

## 2021-08-05 RX ADMIN — Medication 10 MILLIGRAM(S): at 06:03

## 2021-08-05 RX ADMIN — Medication 10 MILLIGRAM(S): at 06:49

## 2021-08-05 RX ADMIN — Medication 12 MILLIGRAM(S): at 05:09

## 2021-08-05 RX ADMIN — Medication 10 MILLIGRAM(S): at 05:23

## 2021-08-05 RX ADMIN — HEPARIN SODIUM 5000 UNIT(S): 5000 INJECTION INTRAVENOUS; SUBCUTANEOUS at 17:40

## 2021-08-05 RX ADMIN — Medication 116 GRAM(S): at 17:42

## 2021-08-05 RX ADMIN — Medication 116 GRAM(S): at 05:10

## 2021-08-05 NOTE — OB PROVIDER H&P - HISTORY OF PRESENT ILLNESS
35 yo  at 33.1 wks gestational age presenting following SROM at home earlier this evening w/ occasional sporadic ctx.  PNC c/w spontaneous bleeding at 22 wks from unknown source.  Patient was on lovenox at that time for heterozygous factor V leiden which was discontinued and bleeding spontaneously resolved.  Denies VB. +Fetal movement.    ObHx:  2018 IOL at 37 wks for severe IUGR w/ pLTCS for NRFHT 3#8  2020 SAB @ 5 wks  gynHx: denies fibroids, ovarian cysts, STIs, abnormal pap smears  PMH: asthma- never intubated or hospitalized, mild intermitted w/ use of albuterol not used since first trimester. Factor V leiden heterozygous- no hx of clotting  PSH: pLTCS  All: NKDA  Meds: PNV, Lovenox and ASA d/c at 22 wk  Social: denies tobacco, alcohol, recreational drug use  Psych: denies anxiety, depression

## 2021-08-05 NOTE — OB PROVIDER H&P - ATTENDING COMMENTS
Agree with resident A&P  - PPROM at 33+wks. s/p BMZ x1. Amp/erythro for latency. Procardia for tocolysis  - Breech presentation- for repeat .

## 2021-08-05 NOTE — OB PROVIDER H&P - NSHPPHYSICALEXAM_GEN_ALL_CORE
Vital Signs Last 24 Hours  T(C): 37.2 (08-05-21 @ 04:07), Max: 37.2 (08-05-21 @ 03:52)  HR: 103 (08-05-21 @ 04:22) (97 - 117)  BP: 130/69 (08-05-21 @ 04:07) (130/69 - 130/69)  RR: 18 (08-05-21 @ 04:07) (18 - 18)  SpO2: 100% (08-05-21 @ 04:22) (100% - 100%)    Gen: NAD  CV: RRR  Pulm: non-labored breathing on room air  Abd: soft, non-tender, gravid uterus, no fundal tenderness  SSE: +pooling, +nitrazine  SVE: C/L/H  TAUS: jared breech, anterior placenta  FHT: baseline 150, mod niko, +accels, no decels  toco: no ctx

## 2021-08-05 NOTE — OB PROVIDER H&P - ASSESSMENT
37 yo  at 33.1 wks w/ PPROM.    1. PPROM  -c/w latency antibiotics: azithryomycin+ ampicillin  -s/p betamethasone for fetal lung maturity  -s/p magnesium for fetal neuroprotection  -continue to monitor fetal status and for signs of labor or infection    2.  Maternal well-being  -Reg diet  -HSQ, SCDs, and ambulation for DVT prophylaxis  -No PP anticoagulation needed    3.  Fetal well being   -NST BID  -ATU sono twice weekly  -prenatal vitamin    4.  Delivery  -delivery at 34 w or earlier if fetal or maternal status deteriorates  - plan for rLTCS at 34 w    MGreenman PGY3

## 2021-08-05 NOTE — OB RN PATIENT PROFILE - NSTRANFUSIONOBJECTION_GEN_ALL_CORE_SIUH
Myopia-Discussed diagnosis with patient. -Explained that people who are myopic are at a higher risk for developing RD/RT and reviewed associated S&S.-Pt to contact our office if symptoms develop. Updated spec Rx given. Recommend lens that will provide comfort as well as protect safety and health of eyes. Cataract OU early-Not yet surgical. -Reviewed symptoms of advancing cataract growth such as glare and halos and decreased vision.-Continue to monitor for now. Pt will notify us if any new symptoms develop.
Patient has no objection to blood transfusions.

## 2021-08-06 RX ORDER — HEPARIN SODIUM 5000 [USP'U]/ML
5000 INJECTION INTRAVENOUS; SUBCUTANEOUS EVERY 12 HOURS
Refills: 0 | Status: DISCONTINUED | OUTPATIENT
Start: 2021-08-06 | End: 2021-08-09

## 2021-08-06 RX ORDER — AMPICILLIN TRIHYDRATE 250 MG
2 CAPSULE ORAL EVERY 6 HOURS
Refills: 0 | Status: DISCONTINUED | OUTPATIENT
Start: 2021-08-06 | End: 2021-08-07

## 2021-08-06 RX ADMIN — Medication 116 GRAM(S): at 12:14

## 2021-08-06 RX ADMIN — Medication 116 GRAM(S): at 00:01

## 2021-08-06 RX ADMIN — HEPARIN SODIUM 5000 UNIT(S): 5000 INJECTION INTRAVENOUS; SUBCUTANEOUS at 17:45

## 2021-08-06 RX ADMIN — SODIUM CHLORIDE 125 MILLILITER(S): 9 INJECTION, SOLUTION INTRAVENOUS at 05:58

## 2021-08-06 RX ADMIN — Medication 116 GRAM(S): at 06:01

## 2021-08-06 RX ADMIN — Medication 12 MILLIGRAM(S): at 05:03

## 2021-08-06 RX ADMIN — Medication 116 GRAM(S): at 17:46

## 2021-08-06 RX ADMIN — Medication 1 TABLET(S): at 12:13

## 2021-08-06 NOTE — PROGRESS NOTE ADULT - SUBJECTIVE AND OBJECTIVE BOX
R3 Antepartum Note,     Overnight pt complaining of CTX q3 minutes. Pt brought to labor floor for continuous monitoring. SVE 0->1cm. Pt reports contractions have slowed down but are still occurring  Pt reports +FM, denies LOF, VB, ctx, HA, epigastric pain, blurred vision, CP, SOB, N/V, fevers, and chills.    Vital Signs Last 24 Hours  T(C): 36.7 (08-06-21 @ 04:42), Max: 37.2 (08-05-21 @ 11:01)  HR: 88 (08-06-21 @ 06:43) (81 - 119)  BP: 105/56 (08-06-21 @ 04:42) (95/52 - 107/62)  RR: 17 (08-06-21 @ 04:42) (17 - 18)  SpO2: 100% (08-06-21 @ 06:43) (94% - 100%)    CAPILLARY BLOOD GLUCOSE          Physical Exam:  General: NAD  Abdomen: Soft, non-tender, gravid  SVE: 1/L/H, soft  Ext: No pain or swelling    Labs:             10.8   12.50 )-----------( 163      ( 08-05 @ 05:51 )             32.8                   MEDICATIONS  (STANDING):  amoxicillin 500 milliGRAM(s) Oral every 8 hours  ampicillin  IVPB 2 Gram(s) IV Intermittent every 6 hours  dextrose 5% + sodium chloride 0.9%. 1000 milliLiter(s) (125 mL/Hr) IV Continuous <Continuous>  lactated ringers. 1000 milliLiter(s) (125 mL/Hr) IV Continuous <Continuous>  prenatal multivitamin 1 Tablet(s) Oral daily    MEDICATIONS  (PRN):

## 2021-08-06 NOTE — PROGRESS NOTE ADULT - SUBJECTIVE AND OBJECTIVE BOX
Att Note  PPROM / breech presentation  Irregular cramping thru the night  Sx resolved  No significant change in cx  Continue current management

## 2021-08-07 RX ORDER — AMOXICILLIN 250 MG/5ML
500 SUSPENSION, RECONSTITUTED, ORAL (ML) ORAL EVERY 8 HOURS
Refills: 0 | Status: DISCONTINUED | OUTPATIENT
Start: 2021-08-07 | End: 2021-08-07

## 2021-08-07 RX ADMIN — HEPARIN SODIUM 5000 UNIT(S): 5000 INJECTION INTRAVENOUS; SUBCUTANEOUS at 18:04

## 2021-08-07 RX ADMIN — Medication 500 MILLIGRAM(S): at 08:28

## 2021-08-07 RX ADMIN — Medication 1 TABLET(S): at 12:41

## 2021-08-07 RX ADMIN — HEPARIN SODIUM 5000 UNIT(S): 5000 INJECTION INTRAVENOUS; SUBCUTANEOUS at 06:01

## 2021-08-07 RX ADMIN — Medication 116 GRAM(S): at 00:47

## 2021-08-07 NOTE — PROGRESS NOTE ADULT - SUBJECTIVE AND OBJECTIVE BOX
R3 Antepartum Note,     No complaints of ctx overnight.  On 8/6 Pt brought to labor floor for continuous monitoring. SVE 0->1cm. contractions slowed down, exam was unchanged and patient was transferred back to 3KNW.  Pt reports +FM, denies LOF, VB, ctx, HA, epigastric pain, blurred vision, CP, SOB, N/V, fevers, and chills.    Vital Signs Last 24 Hours  T(C): 36.7 (08-06-21 @ 04:42), Max: 37.2 (08-05-21 @ 11:01)  HR: 88 (08-06-21 @ 06:43) (81 - 119)  BP: 105/56 (08-06-21 @ 04:42) (95/52 - 107/62)  RR: 17 (08-06-21 @ 04:42) (17 - 18)  SpO2: 100% (08-06-21 @ 06:43) (94% - 100%)    Physical Exam:  General: NAD  Abdomen: Soft, non-tender, gravid  SVE: 1/L/H, soft  Ext: No pain or swelling    Labs:             10.8   12.50 )-----------( 163      ( 08-05 @ 05:51 )             32.8     MEDICATIONS  (STANDING):  amoxicillin 500 milliGRAM(s) Oral every 8 hours  ampicillin  IVPB 2 Gram(s) IV Intermittent every 6 hours  dextrose 5% + sodium chloride 0.9%. 1000 milliLiter(s) (125 mL/Hr) IV Continuous <Continuous>  lactated ringers. 1000 milliLiter(s) (125 mL/Hr) IV Continuous <Continuous>  prenatal multivitamin 1 Tablet(s) Oral daily   R3 Antepartum Note,     No complaints of ctx overnight.  On 8/6 Pt brought to labor floor for continuous monitoring. SVE 0->1cm. contractions slowed down, exam was unchanged and patient was transferred back to 3KNW.  Pt reports +FM, denies LOF, VB, ctx, HA, epigastric pain, blurred vision, CP, SOB, N/V, fevers, and chills.    Vital Signs Last 24 Hrs  T(C): 36.9 (07 Aug 2021 00:44), Max: 36.9 (06 Aug 2021 12:56)  T(F): 98.4 (07 Aug 2021 00:44), Max: 98.4 (06 Aug 2021 12:56)  HR: 100 (07 Aug 2021 00:44) (81 - 101)  BP: 93/54 (07 Aug 2021 00:44) (90/51 - 118/73)  BP(mean): --  RR: 16 (07 Aug 2021 00:44) (16 - 18)  SpO2: 97% (07 Aug 2021 00:44) (97% - 100%)    Physical Exam:  General: NAD  Abdomen: Soft, non-tender, gravid  SVE: 1/L/H, soft  Ext: No pain or swelling    Labs:             10.8   12.50 )-----------( 163      ( 08-05 @ 05:51 )             32.8     MEDICATIONS  (STANDING):  amoxicillin 500 milliGRAM(s) Oral every 8 hours  ampicillin  IVPB 2 Gram(s) IV Intermittent every 6 hours  dextrose 5% + sodium chloride 0.9%. 1000 milliLiter(s) (125 mL/Hr) IV Continuous <Continuous>  lactated ringers. 1000 milliLiter(s) (125 mL/Hr) IV Continuous <Continuous>  prenatal multivitamin 1 Tablet(s) Oral daily

## 2021-08-08 ENCOUNTER — TRANSCRIPTION ENCOUNTER (OUTPATIENT)
Age: 36
End: 2021-08-08

## 2021-08-08 LAB
BLD GP AB SCN SERPL QL: NEGATIVE — SIGNIFICANT CHANGE UP
RH IG SCN BLD-IMP: POSITIVE — SIGNIFICANT CHANGE UP

## 2021-08-08 RX ORDER — AMOXICILLIN 250 MG/5ML
500 SUSPENSION, RECONSTITUTED, ORAL (ML) ORAL EVERY 8 HOURS
Refills: 0 | Status: DISCONTINUED | OUTPATIENT
Start: 2021-08-08 | End: 2021-08-09

## 2021-08-08 RX ORDER — AMOXICILLIN 250 MG/5ML
500 SUSPENSION, RECONSTITUTED, ORAL (ML) ORAL EVERY 8 HOURS
Refills: 0 | Status: COMPLETED | OUTPATIENT
Start: 2021-08-08

## 2021-08-08 RX ADMIN — Medication 1 TABLET(S): at 11:56

## 2021-08-08 RX ADMIN — HEPARIN SODIUM 5000 UNIT(S): 5000 INJECTION INTRAVENOUS; SUBCUTANEOUS at 05:50

## 2021-08-08 RX ADMIN — HEPARIN SODIUM 5000 UNIT(S): 5000 INJECTION INTRAVENOUS; SUBCUTANEOUS at 21:15

## 2021-08-08 RX ADMIN — Medication 500 MILLIGRAM(S): at 22:43

## 2021-08-08 NOTE — PROGRESS NOTE ADULT - ATTENDING COMMENTS
Agree with resident A&P  - stable. Plan for delivery at 34wks unless change in maternal or fetal status
Agree with assessment and plan. Pt doing well without complaints.   - No s/sx's of chorio  - c/w current plan of care

## 2021-08-08 NOTE — PROGRESS NOTE ADULT - SUBJECTIVE AND OBJECTIVE BOX
Patient seen and examined at bedside, no acute overnight events. No acute complaints. NST overnight reactive. Patient endorses good fetal movement. Patient is ambulating and tolerating regular diet. Denies CP, SOB, N/V, fevers, chills, or any other concerns.    Vital Signs Last 24 Hours  T(C): 36.8 (08-08-21 @ 00:00), Max: 37.2 (08-07-21 @ 20:49)  HR: 83 (08-08-21 @ 00:00) (83 - 100)  BP: 96/59 (08-08-21 @ 00:00) (93/57 - 102/65)  RR: 17 (08-08-21 @ 00:00) (16 - 18)  SpO2: 98% (08-08-21 @ 00:00) (98% - 99%)      Physical Exam:  General: NAD  CV: RR  Lungs: breathing comfortably on RA  Abdomen: soft, gravid, non-tender  Ext: no pain or swelling    Labs:             10.8<L>  12.50<H> )-----------( 163      ( 08-05 @ 05:51 )             32.8<L>        MEDICATIONS  (STANDING):  dextrose 5% + sodium chloride 0.9%. 1000 milliLiter(s) (125 mL/Hr) IV Continuous <Continuous>  heparin   Injectable 5000 Unit(s) SubCutaneous every 12 hours  lactated ringers. 1000 milliLiter(s) (125 mL/Hr) IV Continuous <Continuous>  prenatal multivitamin 1 Tablet(s) Oral daily

## 2021-08-09 LAB
APTT BLD: 24.7 SEC — LOW (ref 27.5–35.5)
FIBRINOGEN PPP-MCNC: 525 MG/DL — HIGH (ref 290–520)
HCT VFR BLD CALC: 33.1 % — LOW (ref 34.5–45)
HCT VFR BLD CALC: 36.8 % — SIGNIFICANT CHANGE UP (ref 34.5–45)
HGB BLD-MCNC: 10.7 G/DL — LOW (ref 11.5–15.5)
HGB BLD-MCNC: 11.9 G/DL — SIGNIFICANT CHANGE UP (ref 11.5–15.5)
INR BLD: 0.93 RATIO — SIGNIFICANT CHANGE UP (ref 0.88–1.16)
MCHC RBC-ENTMCNC: 28.4 PG — SIGNIFICANT CHANGE UP (ref 27–34)
MCHC RBC-ENTMCNC: 28.6 PG — SIGNIFICANT CHANGE UP (ref 27–34)
MCHC RBC-ENTMCNC: 32.3 GM/DL — SIGNIFICANT CHANGE UP (ref 32–36)
MCHC RBC-ENTMCNC: 32.3 GM/DL — SIGNIFICANT CHANGE UP (ref 32–36)
MCV RBC AUTO: 87.8 FL — SIGNIFICANT CHANGE UP (ref 80–100)
MCV RBC AUTO: 88.5 FL — SIGNIFICANT CHANGE UP (ref 80–100)
NRBC # BLD: 0 /100 WBCS — SIGNIFICANT CHANGE UP (ref 0–0)
NRBC # BLD: 0 /100 WBCS — SIGNIFICANT CHANGE UP (ref 0–0)
PLATELET # BLD AUTO: 219 K/UL — SIGNIFICANT CHANGE UP (ref 150–400)
PLATELET # BLD AUTO: 229 K/UL — SIGNIFICANT CHANGE UP (ref 150–400)
PROTHROM AB SERPL-ACNC: 11.2 SEC — SIGNIFICANT CHANGE UP (ref 10.6–13.6)
RBC # BLD: 3.77 M/UL — LOW (ref 3.8–5.2)
RBC # BLD: 4.16 M/UL — SIGNIFICANT CHANGE UP (ref 3.8–5.2)
RBC # FLD: 12.9 % — SIGNIFICANT CHANGE UP (ref 10.3–14.5)
RBC # FLD: 13 % — SIGNIFICANT CHANGE UP (ref 10.3–14.5)
WBC # BLD: 14.88 K/UL — HIGH (ref 3.8–10.5)
WBC # BLD: 34.67 K/UL — HIGH (ref 3.8–10.5)
WBC # FLD AUTO: 14.88 K/UL — HIGH (ref 3.8–10.5)
WBC # FLD AUTO: 34.67 K/UL — HIGH (ref 3.8–10.5)

## 2021-08-09 PROCEDURE — 88307 TISSUE EXAM BY PATHOLOGIST: CPT | Mod: 26

## 2021-08-09 RX ORDER — OXYCODONE HYDROCHLORIDE 5 MG/1
5 TABLET ORAL ONCE
Refills: 0 | Status: DISCONTINUED | OUTPATIENT
Start: 2021-08-09 | End: 2021-08-12

## 2021-08-09 RX ORDER — ONDANSETRON 8 MG/1
4 TABLET, FILM COATED ORAL EVERY 6 HOURS
Refills: 0 | Status: DISCONTINUED | OUTPATIENT
Start: 2021-08-09 | End: 2021-08-10

## 2021-08-09 RX ORDER — TETANUS TOXOID, REDUCED DIPHTHERIA TOXOID AND ACELLULAR PERTUSSIS VACCINE, ADSORBED 5; 2.5; 8; 8; 2.5 [IU]/.5ML; [IU]/.5ML; UG/.5ML; UG/.5ML; UG/.5ML
0.5 SUSPENSION INTRAMUSCULAR ONCE
Refills: 0 | Status: DISCONTINUED | OUTPATIENT
Start: 2021-08-09 | End: 2021-08-12

## 2021-08-09 RX ORDER — ACETAMINOPHEN 500 MG
975 TABLET ORAL
Refills: 0 | Status: DISCONTINUED | OUTPATIENT
Start: 2021-08-09 | End: 2021-08-12

## 2021-08-09 RX ORDER — NALBUPHINE HYDROCHLORIDE 10 MG/ML
2.5 INJECTION, SOLUTION INTRAMUSCULAR; INTRAVENOUS; SUBCUTANEOUS EVERY 6 HOURS
Refills: 0 | Status: DISCONTINUED | OUTPATIENT
Start: 2021-08-09 | End: 2021-08-10

## 2021-08-09 RX ORDER — HEPARIN SODIUM 5000 [USP'U]/ML
5000 INJECTION INTRAVENOUS; SUBCUTANEOUS EVERY 12 HOURS
Refills: 0 | Status: DISCONTINUED | OUTPATIENT
Start: 2021-08-09 | End: 2021-08-12

## 2021-08-09 RX ORDER — OXYCODONE HYDROCHLORIDE 5 MG/1
5 TABLET ORAL
Refills: 0 | Status: DISCONTINUED | OUTPATIENT
Start: 2021-08-09 | End: 2021-08-10

## 2021-08-09 RX ORDER — IBUPROFEN 200 MG
600 TABLET ORAL EVERY 6 HOURS
Refills: 0 | Status: COMPLETED | OUTPATIENT
Start: 2021-08-09 | End: 2022-07-08

## 2021-08-09 RX ORDER — IBUPROFEN 200 MG
600 TABLET ORAL EVERY 6 HOURS
Refills: 0 | Status: DISCONTINUED | OUTPATIENT
Start: 2021-08-09 | End: 2021-08-12

## 2021-08-09 RX ORDER — MAGNESIUM HYDROXIDE 400 MG/1
30 TABLET, CHEWABLE ORAL
Refills: 0 | Status: DISCONTINUED | OUTPATIENT
Start: 2021-08-09 | End: 2021-08-12

## 2021-08-09 RX ORDER — CITRIC ACID/SODIUM CITRATE 300-500 MG
15 SOLUTION, ORAL ORAL ONCE
Refills: 0 | Status: COMPLETED | OUTPATIENT
Start: 2021-08-09 | End: 2021-08-09

## 2021-08-09 RX ORDER — LANOLIN
1 OINTMENT (GRAM) TOPICAL EVERY 6 HOURS
Refills: 0 | Status: DISCONTINUED | OUTPATIENT
Start: 2021-08-09 | End: 2021-08-12

## 2021-08-09 RX ORDER — OXYCODONE HYDROCHLORIDE 5 MG/1
5 TABLET ORAL
Refills: 0 | Status: DISCONTINUED | OUTPATIENT
Start: 2021-08-09 | End: 2021-08-12

## 2021-08-09 RX ORDER — DEXAMETHASONE 0.5 MG/5ML
4 ELIXIR ORAL EVERY 6 HOURS
Refills: 0 | Status: DISCONTINUED | OUTPATIENT
Start: 2021-08-09 | End: 2021-08-10

## 2021-08-09 RX ORDER — KETOROLAC TROMETHAMINE 30 MG/ML
30 SYRINGE (ML) INJECTION EVERY 6 HOURS
Refills: 0 | Status: DISCONTINUED | OUTPATIENT
Start: 2021-08-09 | End: 2021-08-10

## 2021-08-09 RX ORDER — SIMETHICONE 80 MG/1
80 TABLET, CHEWABLE ORAL EVERY 4 HOURS
Refills: 0 | Status: DISCONTINUED | OUTPATIENT
Start: 2021-08-09 | End: 2021-08-12

## 2021-08-09 RX ORDER — DIPHENHYDRAMINE HCL 50 MG
25 CAPSULE ORAL EVERY 6 HOURS
Refills: 0 | Status: DISCONTINUED | OUTPATIENT
Start: 2021-08-09 | End: 2021-08-12

## 2021-08-09 RX ORDER — OXYTOCIN 10 UNIT/ML
333.33 VIAL (ML) INJECTION
Qty: 20 | Refills: 0 | Status: DISCONTINUED | OUTPATIENT
Start: 2021-08-09 | End: 2021-08-12

## 2021-08-09 RX ORDER — SODIUM CHLORIDE 9 MG/ML
1000 INJECTION, SOLUTION INTRAVENOUS
Refills: 0 | Status: DISCONTINUED | OUTPATIENT
Start: 2021-08-09 | End: 2021-08-12

## 2021-08-09 RX ORDER — CEFAZOLIN SODIUM 1 G
VIAL (EA) INJECTION
Refills: 0 | Status: COMPLETED | OUTPATIENT
Start: 2021-08-09 | End: 2021-08-10

## 2021-08-09 RX ORDER — FAMOTIDINE 10 MG/ML
20 INJECTION INTRAVENOUS ONCE
Refills: 0 | Status: COMPLETED | OUTPATIENT
Start: 2021-08-09 | End: 2021-08-09

## 2021-08-09 RX ORDER — NALOXONE HYDROCHLORIDE 4 MG/.1ML
0.1 SPRAY NASAL
Refills: 0 | Status: DISCONTINUED | OUTPATIENT
Start: 2021-08-09 | End: 2021-08-10

## 2021-08-09 RX ORDER — CEFAZOLIN SODIUM 1 G
2000 VIAL (EA) INJECTION ONCE
Refills: 0 | Status: COMPLETED | OUTPATIENT
Start: 2021-08-09 | End: 2021-08-09

## 2021-08-09 RX ORDER — CEFAZOLIN SODIUM 1 G
2000 VIAL (EA) INJECTION EVERY 8 HOURS
Refills: 0 | Status: COMPLETED | OUTPATIENT
Start: 2021-08-09 | End: 2021-08-10

## 2021-08-09 RX ORDER — MORPHINE SULFATE 50 MG/1
0.1 CAPSULE, EXTENDED RELEASE ORAL ONCE
Refills: 0 | Status: DISCONTINUED | OUTPATIENT
Start: 2021-08-09 | End: 2021-08-10

## 2021-08-09 RX ORDER — OXYCODONE HYDROCHLORIDE 5 MG/1
10 TABLET ORAL
Refills: 0 | Status: DISCONTINUED | OUTPATIENT
Start: 2021-08-09 | End: 2021-08-10

## 2021-08-09 RX ADMIN — HEPARIN SODIUM 5000 UNIT(S): 5000 INJECTION INTRAVENOUS; SUBCUTANEOUS at 22:39

## 2021-08-09 RX ADMIN — Medication 975 MILLIGRAM(S): at 22:47

## 2021-08-09 RX ADMIN — FAMOTIDINE 20 MILLIGRAM(S): 10 INJECTION INTRAVENOUS at 14:04

## 2021-08-09 RX ADMIN — Medication 975 MILLIGRAM(S): at 23:32

## 2021-08-09 RX ADMIN — Medication 100 MILLIGRAM(S): at 22:47

## 2021-08-09 RX ADMIN — Medication 2000 MILLIGRAM(S): at 14:33

## 2021-08-09 RX ADMIN — Medication 1000 MILLIUNIT(S)/MIN: at 16:47

## 2021-08-09 RX ADMIN — Medication 1000 MILLIUNIT(S)/MIN: at 16:48

## 2021-08-09 RX ADMIN — Medication 15 MILLILITER(S): at 14:03

## 2021-08-09 RX ADMIN — SODIUM CHLORIDE 125 MILLILITER(S): 9 INJECTION, SOLUTION INTRAVENOUS at 14:04

## 2021-08-09 RX ADMIN — HEPARIN SODIUM 5000 UNIT(S): 5000 INJECTION INTRAVENOUS; SUBCUTANEOUS at 09:23

## 2021-08-09 RX ADMIN — Medication 500 MILLIGRAM(S): at 05:48

## 2021-08-09 NOTE — OB RN INTRAOPERATIVE NOTE - NSSELHIDDEN_OBGYN_ALL_OB_FT
[NS_DeliveryAttending1_OBGYN_ALL_OB_FT:[id number]],[NS_DeliveryAssist1_OBGYN_ALL_OB_FT:[id number]],[NS_DeliveryRN_OBGYN_ALL_OB_FT:[id number]==],[NS_CirculateRN2_OBGYN_ALL_OB_FT:[id number]]

## 2021-08-09 NOTE — PRE-ANESTHESIA EVALUATION ADULT - NSANTHADDINFOFT_GEN_ALL_CORE
CSE , intrathecal duramorph explained to pt in detail;   risks include but not limited to HA, failure, nv injury.  All questions answered.

## 2021-08-09 NOTE — PROGRESS NOTE ADULT - SUBJECTIVE AND OBJECTIVE BOX
Att Note  Condition stable  No acute events  Episodic cramping  Fetal testing unremarkable  Arrangements to be make for c/s at approx 34 weeks

## 2021-08-09 NOTE — CHART NOTE - NSCHARTNOTEFT_GEN_A_CORE
R3 Update    Patient seen and evaluated at bedside after notification by RN that patient had spotting when using the bathroom.  Patient reports light pink spotting with wiping.  She is feeling weak contractions that are consistent w/ ctx on toco.  Patient re-examined, on speculum no blood in vault, cervix visibly dilated slightly.  On SVE 1/L/H.  No uterine fundal tenderness.    Tracing category 1, ctx irregular.  Patient clear to stay on 3KNW, will re-evaluate if increasing pain or regular ctx.    d/w Dr. Anand Bocanegra PGY3
Called by residents with Breech, 3-4cm. After spinal DF breech and 10cm. Urgent  done
R3 Note    Pt placed on EFM/Hixton 2/2 feeling contractions  Contractions palpated at bedside  SVE 2-3cm with feet palpated behind cervix  plan for c/s    MYNOR Christensen, PGY-3  d/w Dr. Michel

## 2021-08-09 NOTE — PRE-ANESTHESIA EVALUATION ADULT - NSANTHPMHFT_GEN_ALL_CORE
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 denies hx/o abnormal bleeding/bruising  Primary C/S 2019 @ 37 wks for IUGR  Mild asthma, no Rx, no admissions/ER visits

## 2021-08-09 NOTE — OB RN DELIVERY SUMMARY - NS_SEPSISRSKCALC_OBGYN_ALL_OB_FT
No temperature has been documented for this patient in CPN or on the OB Flowsheet. Ensure the highest temperature during labor was documented on the OB Flowsheet.  Unable to calculate the EOS score due to gestational age being less than 34 weeks or more than 43 weeks.  Rupture of membranes must be entered above.  'Type of intrapartum antibiotics' must be entered above.   No temperature has been documented for this patient in CPN or on the OB Flowsheet. Ensure the highest temperature during labor was documented on the OB Flowsheet.  Unable to calculate the EOS score due to gestational age being less than 34 weeks or more than 43 weeks.  Rupture of membranes must be entered above.

## 2021-08-09 NOTE — PROGRESS NOTE ADULT - SUBJECTIVE AND OBJECTIVE BOX
R3 Antepartum Note      Patient seen and examined at bedside, no acute overnight events. No acute complaints. Pt reports +FM, denies LOF, VB, ctx, HA, epigastric pain, blurred vision, CP, SOB, N/V, fevers, and chills.    Vital Signs Last 24 Hours  T(C): 36.5 (08-09-21 @ 05:53), Max: 37.1 (08-08-21 @ 12:14)  HR: 75 (08-09-21 @ 05:53) (75 - 98)  BP: 100/65 (08-09-21 @ 05:53) (94/59 - 109/68)  RR: 17 (08-09-21 @ 05:53) (17 - 18)  SpO2: 97% (08-09-21 @ 05:53) (97% - 100%)    CAPILLARY BLOOD GLUCOSE          Physical Exam:  General: NAD  Abdomen: Soft, non-tender, gravid  Ext: No pain or swelling    Labs:                MEDICATIONS  (STANDING):  amoxicillin 500 milliGRAM(s) Oral every 8 hours  dextrose 5% + sodium chloride 0.9%. 1000 milliLiter(s) (125 mL/Hr) IV Continuous <Continuous>  heparin   Injectable 5000 Unit(s) SubCutaneous every 12 hours  lactated ringers. 1000 milliLiter(s) (125 mL/Hr) IV Continuous <Continuous>  prenatal multivitamin 1 Tablet(s) Oral daily    MEDICATIONS  (PRN):

## 2021-08-09 NOTE — OB PROVIDER DELIVERY SUMMARY - NSPROVIDERDELIVERYNOTE_OBGYN_ALL_OB_FT
Pt brought to OR 2/2 contractions with cervical dilation. SVE after spinal anesthesia 10cm with feet palpated in vagina. Decision made to proceed with emergent  section. Emergency time out performed. Delivery performed of liveborn male infant from double footling breech with majority of fetus in vagina at time of delivery. apgars 8/9  placenta difficult to extract intact with area of fibrin deposit in placental tissue  grossly normal uterus, b/l tubes and ovaries  R lateral hysterotomy extension, ballooned out lower uterine segment  vaginal exam performed by MD Doyle with intact mucosa  fibrillar placed over extension, hysterotomy and bladder flap  811/2000/1100

## 2021-08-09 NOTE — OB NEONATOLOGY/PEDIATRICIAN DELIVERY SUMMARY - NSPEDSNEONOTESA_OBGYN_ALL_OB_FT
Baby born to a 37 yo  female at 33.5 weeks of GA. BG A+, HIV NR, RPR NR, HepB negative, rubella immune, GBS negative 21. PPROM at 21 (4 days), clear fluid. S/p BMZ -. CS for in labor and footling breech.    Maternal h/o factor V mutation.    Baby came out crying, good tone. DCC for 30 seconds done. Dried and stimulated under radiant warmer. Transitioned well. APGAR 9/9 at 1 and 5 minutes respectively.    Gross physical examination was remarkable for bruising over testicular area, full physical exam do be done in nicu.    Mother wants to breast feed, yes for hepatitis B vaccine and circ for the baby.

## 2021-08-10 LAB
BASOPHILS # BLD AUTO: 0 K/UL — SIGNIFICANT CHANGE UP (ref 0–0.2)
BASOPHILS NFR BLD AUTO: 0 % — SIGNIFICANT CHANGE UP (ref 0–2)
EOSINOPHIL # BLD AUTO: 0 K/UL — SIGNIFICANT CHANGE UP (ref 0–0.5)
EOSINOPHIL NFR BLD AUTO: 0 % — SIGNIFICANT CHANGE UP (ref 0–6)
HCT VFR BLD CALC: 32 % — LOW (ref 34.5–45)
HGB BLD-MCNC: 10.4 G/DL — LOW (ref 11.5–15.5)
LYMPHOCYTES # BLD AUTO: 2.63 K/UL — SIGNIFICANT CHANGE UP (ref 1–3.3)
LYMPHOCYTES # BLD AUTO: 9.7 % — LOW (ref 13–44)
MCHC RBC-ENTMCNC: 28 PG — SIGNIFICANT CHANGE UP (ref 27–34)
MCHC RBC-ENTMCNC: 32.5 GM/DL — SIGNIFICANT CHANGE UP (ref 32–36)
MCV RBC AUTO: 86.3 FL — SIGNIFICANT CHANGE UP (ref 80–100)
MONOCYTES # BLD AUTO: 1.68 K/UL — HIGH (ref 0–0.9)
MONOCYTES NFR BLD AUTO: 6.2 % — SIGNIFICANT CHANGE UP (ref 2–14)
NEUTROPHILS # BLD AUTO: 22.77 K/UL — HIGH (ref 1.8–7.4)
NEUTROPHILS NFR BLD AUTO: 84.1 % — HIGH (ref 43–77)
PLATELET # BLD AUTO: 218 K/UL — SIGNIFICANT CHANGE UP (ref 150–400)
RBC # BLD: 3.71 M/UL — LOW (ref 3.8–5.2)
RBC # FLD: 12.7 % — SIGNIFICANT CHANGE UP (ref 10.3–14.5)
WBC # BLD: 27.08 K/UL — HIGH (ref 3.8–10.5)
WBC # FLD AUTO: 27.08 K/UL — HIGH (ref 3.8–10.5)

## 2021-08-10 RX ORDER — KETOROLAC TROMETHAMINE 30 MG/ML
30 SYRINGE (ML) INJECTION EVERY 6 HOURS
Refills: 0 | Status: DISCONTINUED | OUTPATIENT
Start: 2021-08-10 | End: 2021-08-11

## 2021-08-10 RX ORDER — IBUPROFEN 200 MG
600 TABLET ORAL EVERY 6 HOURS
Refills: 0 | Status: DISCONTINUED | OUTPATIENT
Start: 2021-08-10 | End: 2021-08-12

## 2021-08-10 RX ADMIN — Medication 30 MILLIGRAM(S): at 09:24

## 2021-08-10 RX ADMIN — HEPARIN SODIUM 5000 UNIT(S): 5000 INJECTION INTRAVENOUS; SUBCUTANEOUS at 10:29

## 2021-08-10 RX ADMIN — Medication 30 MILLIGRAM(S): at 09:55

## 2021-08-10 RX ADMIN — Medication 975 MILLIGRAM(S): at 04:10

## 2021-08-10 RX ADMIN — HEPARIN SODIUM 5000 UNIT(S): 5000 INJECTION INTRAVENOUS; SUBCUTANEOUS at 22:08

## 2021-08-10 RX ADMIN — Medication 975 MILLIGRAM(S): at 03:34

## 2021-08-10 RX ADMIN — Medication 975 MILLIGRAM(S): at 12:34

## 2021-08-10 RX ADMIN — Medication 975 MILLIGRAM(S): at 17:31

## 2021-08-10 RX ADMIN — Medication 30 MILLIGRAM(S): at 20:06

## 2021-08-10 RX ADMIN — SIMETHICONE 80 MILLIGRAM(S): 80 TABLET, CHEWABLE ORAL at 20:06

## 2021-08-10 RX ADMIN — Medication 975 MILLIGRAM(S): at 13:05

## 2021-08-10 RX ADMIN — Medication 100 MILLIGRAM(S): at 14:05

## 2021-08-10 RX ADMIN — Medication 100 MILLIGRAM(S): at 06:15

## 2021-08-10 RX ADMIN — Medication 975 MILLIGRAM(S): at 18:00

## 2021-08-10 RX ADMIN — Medication 30 MILLIGRAM(S): at 15:25

## 2021-08-10 RX ADMIN — Medication 975 MILLIGRAM(S): at 23:12

## 2021-08-10 RX ADMIN — Medication 30 MILLIGRAM(S): at 20:57

## 2021-08-10 RX ADMIN — Medication 30 MILLIGRAM(S): at 14:54

## 2021-08-10 NOTE — PROGRESS NOTE ADULT - SUBJECTIVE AND OBJECTIVE BOX
Day 1 of Anesthesia Pain Management Service    SUBJECTIVE: Doing ok  Pain Scale Score:          [X] Refer to charted pain scores    THERAPY:    s/p   100mcg PF morphine on 8\9\2021      MEDICATIONS  (STANDING):  acetaminophen   Tablet .. 975 milliGRAM(s) Oral <User Schedule>  acetaminophen   Tablet .. 975 milliGRAM(s) Oral <User Schedule>  ceFAZolin   IVPB 2000 milliGRAM(s) IV Intermittent every 8 hours  ceFAZolin   IVPB      diphtheria/tetanus/pertussis (acellular) Vaccine (ADAcel) 0.5 milliLiter(s) IntraMuscular once  diphtheria/tetanus/pertussis (acellular) Vaccine (ADAcel) 0.5 milliLiter(s) IntraMuscular once  heparin   Injectable 5000 Unit(s) SubCutaneous every 12 hours  heparin   Injectable 5000 Unit(s) SubCutaneous every 12 hours  ibuprofen  Tablet. 600 milliGRAM(s) Oral every 6 hours  ibuprofen  Tablet. 600 milliGRAM(s) Oral every 6 hours  lactated ringers. 1000 milliLiter(s) (125 mL/Hr) IV Continuous <Continuous>  lactated ringers. 1000 milliLiter(s) (125 mL/Hr) IV Continuous <Continuous>  morphine PF Spinal 0.1 milliGRAM(s) IntraThecal. once  oxytocin Infusion 333.333 milliUNIT(s)/Min (1000 mL/Hr) IV Continuous <Continuous>  oxytocin Infusion 333.333 milliUNIT(s)/Min (1000 mL/Hr) IV Continuous <Continuous>    MEDICATIONS  (PRN):  dexAMETHasone  Injectable 4 milliGRAM(s) IV Push every 6 hours PRN Nausea  diphenhydrAMINE 25 milliGRAM(s) Oral every 6 hours PRN Pruritus  diphenhydrAMINE 25 milliGRAM(s) Oral every 6 hours PRN Pruritus  lanolin Ointment 1 Application(s) Topical every 6 hours PRN Sore Nipples  lanolin Ointment 1 Application(s) Topical every 6 hours PRN Sore Nipples  magnesium hydroxide Suspension 30 milliLiter(s) Oral two times a day PRN Constipation  magnesium hydroxide Suspension 30 milliLiter(s) Oral two times a day PRN Constipation  nalbuphine Injectable 2.5 milliGRAM(s) IV Push every 6 hours PRN Pruritus  naloxone Injectable 0.1 milliGRAM(s) IV Push every 3 minutes PRN For ANY of the following changes in patient status:  A. Breaths Per Minute LESS THAN 10, B. Oxygen saturation LESS THAN 90%, C. Sedation score of 6 for Stop After: 4 Times  ondansetron Injectable 4 milliGRAM(s) IV Push every 6 hours PRN Nausea  oxyCODONE    IR 5 milliGRAM(s) Oral every 3 hours PRN Mild Pain (1 - 3)  oxyCODONE    IR 10 milliGRAM(s) Oral every 3 hours PRN Moderate Pain (4 - 6)  oxyCODONE    IR 5 milliGRAM(s) Oral every 3 hours PRN Moderate to Severe Pain (4-10)  oxyCODONE    IR 5 milliGRAM(s) Oral once PRN Moderate to Severe Pain (4-10)  oxyCODONE    IR 5 milliGRAM(s) Oral every 3 hours PRN Moderate to Severe Pain (4-10)  oxyCODONE    IR 5 milliGRAM(s) Oral once PRN Moderate to Severe Pain (4-10)  simethicone 80 milliGRAM(s) Chew every 4 hours PRN Gas  simethicone 80 milliGRAM(s) Chew every 4 hours PRN Gas      OBJECTIVE:    Sedation:        	[X] Alert	 [ ] Drowsy	[ ] Arousable      [ ] Asleep       [ ] Unresponsive    Side Effects:	[X] None 	[ ] Nausea	[ ] Vomiting         [ ] Pruritus  		[ ] Weakness            [ ] Numbness	          [ ] Other:    Vital Signs Last 24 Hrs  T(C): 36.8 (10 Aug 2021 08:59), Max: 37 (09 Aug 2021 18:15)  T(F): 98.3 (10 Aug 2021 08:59), Max: 98.6 (09 Aug 2021 18:15)  HR: 74 (10 Aug 2021 08:59) (62 - 96)  BP: 103/66 (10 Aug 2021 08:59) (99/61 - 120/70)  BP(mean): 80 (09 Aug 2021 18:15) (76 - 86)  RR: 18 (10 Aug 2021 08:59) (16 - 25)  SpO2: 100% (10 Aug 2021 08:59) (97% - 100%)    ASSESSMENT/ PLAN  [X] Patient to be transitioned to prn analgesics after 24 hours  [X] Pain management per primary service, pain service to sign off   [X]Documentation and Verification of current medications

## 2021-08-10 NOTE — PROGRESS NOTE ADULT - SUBJECTIVE AND OBJECTIVE BOX
OB Postpartum Note:  Delivery    S: 35yo now POD #1 s/p LTCS. Her pain is well controlled. She is tolerating a regular diet but has not yet passed flatus. Voiding spontaneously and ambulating without difficulty. Denies N/V. Denies CP/SOB/lightheadedness/dizziness.     O:   Vitals:  Vital Signs Last 24 Hrs  T(C): 36.9 (10 Aug 2021 06:27), Max: 37.2 (09 Aug 2021 09:15)  T(F): 98.5 (10 Aug 2021 06:27), Max: 98.9 (09 Aug 2021 09:15)  HR: 81 (10 Aug 2021 06:27) (62 - 96)  BP: 99/61 (10 Aug 2021 06:27) (99/61 - 120/70)  BP(mean): 80 (09 Aug 2021 18:15) (76 - 86)  RR: 18 (10 Aug 2021 06:27) (16 - 25)  SpO2: 97% (10 Aug 2021 06:27) (97% - 100%)    MEDICATIONS  (STANDING):  acetaminophen   Tablet .. 975 milliGRAM(s) Oral <User Schedule>  acetaminophen   Tablet .. 975 milliGRAM(s) Oral <User Schedule>  ceFAZolin   IVPB 2000 milliGRAM(s) IV Intermittent every 8 hours  ceFAZolin   IVPB      diphtheria/tetanus/pertussis (acellular) Vaccine (ADAcel) 0.5 milliLiter(s) IntraMuscular once  diphtheria/tetanus/pertussis (acellular) Vaccine (ADAcel) 0.5 milliLiter(s) IntraMuscular once  heparin   Injectable 5000 Unit(s) SubCutaneous every 12 hours  heparin   Injectable 5000 Unit(s) SubCutaneous every 12 hours  ibuprofen  Tablet. 600 milliGRAM(s) Oral every 6 hours  ibuprofen  Tablet. 600 milliGRAM(s) Oral every 6 hours  ketorolac   Injectable 30 milliGRAM(s) IV Push every 6 hours  lactated ringers. 1000 milliLiter(s) (125 mL/Hr) IV Continuous <Continuous>  lactated ringers. 1000 milliLiter(s) (125 mL/Hr) IV Continuous <Continuous>  morphine PF Spinal 0.1 milliGRAM(s) IntraThecal. once  oxytocin Infusion 333.333 milliUNIT(s)/Min (1000 mL/Hr) IV Continuous <Continuous>  oxytocin Infusion 333.333 milliUNIT(s)/Min (1000 mL/Hr) IV Continuous <Continuous>    MEDICATIONS  (PRN):  dexAMETHasone  Injectable 4 milliGRAM(s) IV Push every 6 hours PRN Nausea  diphenhydrAMINE 25 milliGRAM(s) Oral every 6 hours PRN Pruritus  diphenhydrAMINE 25 milliGRAM(s) Oral every 6 hours PRN Pruritus  lanolin Ointment 1 Application(s) Topical every 6 hours PRN Sore Nipples  lanolin Ointment 1 Application(s) Topical every 6 hours PRN Sore Nipples  magnesium hydroxide Suspension 30 milliLiter(s) Oral two times a day PRN Constipation  magnesium hydroxide Suspension 30 milliLiter(s) Oral two times a day PRN Constipation  nalbuphine Injectable 2.5 milliGRAM(s) IV Push every 6 hours PRN Pruritus  naloxone Injectable 0.1 milliGRAM(s) IV Push every 3 minutes PRN For ANY of the following changes in patient status:  A. Breaths Per Minute LESS THAN 10, B. Oxygen saturation LESS THAN 90%, C. Sedation score of 6 for Stop After: 4 Times  ondansetron Injectable 4 milliGRAM(s) IV Push every 6 hours PRN Nausea  oxyCODONE    IR 5 milliGRAM(s) Oral every 3 hours PRN Mild Pain (1 - 3)  oxyCODONE    IR 10 milliGRAM(s) Oral every 3 hours PRN Moderate Pain (4 - 6)  oxyCODONE    IR 5 milliGRAM(s) Oral every 3 hours PRN Moderate to Severe Pain (4-10)  oxyCODONE    IR 5 milliGRAM(s) Oral once PRN Moderate to Severe Pain (4-10)  oxyCODONE    IR 5 milliGRAM(s) Oral every 3 hours PRN Moderate to Severe Pain (4-10)  oxyCODONE    IR 5 milliGRAM(s) Oral once PRN Moderate to Severe Pain (4-10)  simethicone 80 milliGRAM(s) Chew every 4 hours PRN Gas  simethicone 80 milliGRAM(s) Chew every 4 hours PRN Gas      Labs:  Blood type: B Positive  Rubella IgG: RPR:                           10.4<L>   27.08<H> >-----------< 218    ( 08-10 @ 06:41 )             32.0<L>                        10.7<L>   34.67<H> >-----------< 219    (  @ 19:36 )             33.1<L>                        11.9   14.88<H> >-----------< 229    (  @ 13:19 )             36.8                  PE:  General: NAD, patient resting comfortably in bed  Abdomen: Mildly distended, appropriately tender  Incision: Clean, dry, intact.  Extremities: SCDs in place, no erythema

## 2021-08-11 RX ADMIN — Medication 30 MILLIGRAM(S): at 08:36

## 2021-08-11 RX ADMIN — SIMETHICONE 80 MILLIGRAM(S): 80 TABLET, CHEWABLE ORAL at 15:13

## 2021-08-11 RX ADMIN — Medication 600 MILLIGRAM(S): at 15:50

## 2021-08-11 RX ADMIN — Medication 975 MILLIGRAM(S): at 19:11

## 2021-08-11 RX ADMIN — Medication 600 MILLIGRAM(S): at 15:14

## 2021-08-11 RX ADMIN — Medication 30 MILLIGRAM(S): at 09:25

## 2021-08-11 RX ADMIN — HEPARIN SODIUM 5000 UNIT(S): 5000 INJECTION INTRAVENOUS; SUBCUTANEOUS at 22:01

## 2021-08-11 RX ADMIN — Medication 30 MILLIGRAM(S): at 03:13

## 2021-08-11 RX ADMIN — Medication 600 MILLIGRAM(S): at 20:50

## 2021-08-11 RX ADMIN — Medication 975 MILLIGRAM(S): at 13:15

## 2021-08-11 RX ADMIN — Medication 975 MILLIGRAM(S): at 23:45

## 2021-08-11 RX ADMIN — MAGNESIUM HYDROXIDE 30 MILLILITER(S): 400 TABLET, CHEWABLE ORAL at 12:35

## 2021-08-11 RX ADMIN — Medication 30 MILLIGRAM(S): at 02:22

## 2021-08-11 RX ADMIN — HEPARIN SODIUM 5000 UNIT(S): 5000 INJECTION INTRAVENOUS; SUBCUTANEOUS at 10:16

## 2021-08-11 RX ADMIN — Medication 600 MILLIGRAM(S): at 21:34

## 2021-08-11 RX ADMIN — Medication 975 MILLIGRAM(S): at 12:36

## 2021-08-11 RX ADMIN — Medication 975 MILLIGRAM(S): at 00:05

## 2021-08-11 RX ADMIN — Medication 975 MILLIGRAM(S): at 05:14

## 2021-08-11 NOTE — PROGRESS NOTE ADULT - SUBJECTIVE AND OBJECTIVE BOX
Pt seen and examined at bedside. Pt states mild abdominal pain. Pt [x ] ambulating, tolerating _reg__ diet, [ ] flatus, [ ]BM, [x ] urinating adequately.   Pt denies fever, chills, chest pain, SOB, nausea, vomiting, lightheadedness, dizziness.      T(F): 98.1 (08-11-21 @ 05:14), Max: 98.2 (08-10-21 @ 13:06)  HR: 77 (08-11-21 @ 05:14) (77 - 88)  BP: 105/67 (08-11-21 @ 05:14) (98/58 - 105/67)  RR: 18 (08-11-21 @ 05:14) (17 - 18)  SpO2: 98% (08-11-21 @ 05:14) (97% - 99%)  Wt(kg): --  I&O's Summary      MEDICATIONS  (STANDING):  acetaminophen   Tablet .. 975 milliGRAM(s) Oral <User Schedule>  acetaminophen   Tablet .. 975 milliGRAM(s) Oral <User Schedule>  diphtheria/tetanus/pertussis (acellular) Vaccine (ADAcel) 0.5 milliLiter(s) IntraMuscular once  diphtheria/tetanus/pertussis (acellular) Vaccine (ADAcel) 0.5 milliLiter(s) IntraMuscular once  heparin   Injectable 5000 Unit(s) SubCutaneous every 12 hours  heparin   Injectable 5000 Unit(s) SubCutaneous every 12 hours  ibuprofen  Tablet. 600 milliGRAM(s) Oral every 6 hours  ibuprofen  Tablet. 600 milliGRAM(s) Oral every 6 hours  lactated ringers. 1000 milliLiter(s) (125 mL/Hr) IV Continuous <Continuous>  lactated ringers. 1000 milliLiter(s) (125 mL/Hr) IV Continuous <Continuous>  oxytocin Infusion 333.333 milliUNIT(s)/Min (1000 mL/Hr) IV Continuous <Continuous>  oxytocin Infusion 333.333 milliUNIT(s)/Min (1000 mL/Hr) IV Continuous <Continuous>    MEDICATIONS  (PRN):  diphenhydrAMINE 25 milliGRAM(s) Oral every 6 hours PRN Pruritus  diphenhydrAMINE 25 milliGRAM(s) Oral every 6 hours PRN Pruritus  lanolin Ointment 1 Application(s) Topical every 6 hours PRN Sore Nipples  lanolin Ointment 1 Application(s) Topical every 6 hours PRN Sore Nipples  magnesium hydroxide Suspension 30 milliLiter(s) Oral two times a day PRN Constipation  magnesium hydroxide Suspension 30 milliLiter(s) Oral two times a day PRN Constipation  oxyCODONE    IR 5 milliGRAM(s) Oral every 3 hours PRN Moderate to Severe Pain (4-10)  oxyCODONE    IR 5 milliGRAM(s) Oral once PRN Moderate to Severe Pain (4-10)  oxyCODONE    IR 5 milliGRAM(s) Oral every 3 hours PRN Moderate to Severe Pain (4-10)  oxyCODONE    IR 5 milliGRAM(s) Oral once PRN Moderate to Severe Pain (4-10)  simethicone 80 milliGRAM(s) Chew every 4 hours PRN Gas  simethicone 80 milliGRAM(s) Chew every 4 hours PRN Gas      Physical Exam:  Constitutional: NAD  Pulmonary: clear to auscultation bilaterally   Cardiovascular: Regular rate and rhythm   Abdomen: incision site clean, dry, intact. Soft, mildly tender, [ ] distended, no guarding, no rebound, [ ] bowel sounds  Extremities: no lower extremity edema or calve tenderness. SCDs in place     LABS:                        10.4   27.08 )-----------( 218      ( 10 Aug 2021 06:41 )             32.0           PT/INR - ( 09 Aug 2021 13:19 )   PT: 11.2 sec;   INR: 0.93 ratio         PTT - ( 09 Aug 2021 13:19 )  PTT:24.7 sec      RADIOLOGY & ADDITIONAL TESTS:

## 2021-08-11 NOTE — PROGRESS NOTE ADULT - SUBJECTIVE AND OBJECTIVE BOX
OB Postpartum Note:  Delivery    S: 35yo now POD #2 s/p LTCS. Her pain is well controlled. She is tolerating a regular diet and is passing flatus. Voiding spontaneously and ambulating without difficulty. Denies N/V. Denies CP/SOB/lightheadedness/dizziness.     O:   Vitals:  Vital Signs Last 24 Hrs  T(C): 36.7 (11 Aug 2021 05:14), Max: 36.8 (10 Aug 2021 08:59)  T(F): 98.1 (11 Aug 2021 05:14), Max: 98.3 (10 Aug 2021 08:59)  HR: 77 (11 Aug 2021 05:14) (74 - 88)  BP: 105/67 (11 Aug 2021 05:14) (98/58 - 105/67)  BP(mean): --  RR: 18 (11 Aug 2021 05:14) (17 - 18)  SpO2: 98% (11 Aug 2021 05:14) (97% - 100%)    MEDICATIONS  (STANDING):  acetaminophen   Tablet .. 975 milliGRAM(s) Oral <User Schedule>  acetaminophen   Tablet .. 975 milliGRAM(s) Oral <User Schedule>  diphtheria/tetanus/pertussis (acellular) Vaccine (ADAcel) 0.5 milliLiter(s) IntraMuscular once  diphtheria/tetanus/pertussis (acellular) Vaccine (ADAcel) 0.5 milliLiter(s) IntraMuscular once  heparin   Injectable 5000 Unit(s) SubCutaneous every 12 hours  heparin   Injectable 5000 Unit(s) SubCutaneous every 12 hours  ibuprofen  Tablet. 600 milliGRAM(s) Oral every 6 hours  ibuprofen  Tablet. 600 milliGRAM(s) Oral every 6 hours  ketorolac   Injectable 30 milliGRAM(s) IV Push every 6 hours  lactated ringers. 1000 milliLiter(s) (125 mL/Hr) IV Continuous <Continuous>  lactated ringers. 1000 milliLiter(s) (125 mL/Hr) IV Continuous <Continuous>  oxytocin Infusion 333.333 milliUNIT(s)/Min (1000 mL/Hr) IV Continuous <Continuous>  oxytocin Infusion 333.333 milliUNIT(s)/Min (1000 mL/Hr) IV Continuous <Continuous>    MEDICATIONS  (PRN):  diphenhydrAMINE 25 milliGRAM(s) Oral every 6 hours PRN Pruritus  diphenhydrAMINE 25 milliGRAM(s) Oral every 6 hours PRN Pruritus  lanolin Ointment 1 Application(s) Topical every 6 hours PRN Sore Nipples  lanolin Ointment 1 Application(s) Topical every 6 hours PRN Sore Nipples  magnesium hydroxide Suspension 30 milliLiter(s) Oral two times a day PRN Constipation  magnesium hydroxide Suspension 30 milliLiter(s) Oral two times a day PRN Constipation  oxyCODONE    IR 5 milliGRAM(s) Oral every 3 hours PRN Moderate to Severe Pain (4-10)  oxyCODONE    IR 5 milliGRAM(s) Oral once PRN Moderate to Severe Pain (4-10)  oxyCODONE    IR 5 milliGRAM(s) Oral every 3 hours PRN Moderate to Severe Pain (4-10)  oxyCODONE    IR 5 milliGRAM(s) Oral once PRN Moderate to Severe Pain (4-10)  simethicone 80 milliGRAM(s) Chew every 4 hours PRN Gas  simethicone 80 milliGRAM(s) Chew every 4 hours PRN Gas      Labs:  Blood type: B Positive  Rubella IgG: RPR:                           10.4<L>   27.08<H> >-----------< 218    ( 08-10 @ 06:41 )             32.0<L>                        10.7<L>   34.67<H> >-----------< 219    (  @ 19:36 )             33.1<L>                        11.9   14.88<H> >-----------< 229    (  @ 13:19 )             36.8                  PE:  General: NAD, patient resting comfortably in bed  Abdomen: Mildly distended, appropriately tender. No rebound tenderness or guarding. Incision c/d/i.  Extremities: SCDs in place, no erythema

## 2021-08-12 ENCOUNTER — TRANSCRIPTION ENCOUNTER (OUTPATIENT)
Age: 36
End: 2021-08-12

## 2021-08-12 VITALS
OXYGEN SATURATION: 99 % | DIASTOLIC BLOOD PRESSURE: 70 MMHG | RESPIRATION RATE: 18 BRPM | TEMPERATURE: 98 F | HEART RATE: 81 BPM | SYSTOLIC BLOOD PRESSURE: 109 MMHG

## 2021-08-12 PROCEDURE — 59050 FETAL MONITOR W/REPORT: CPT

## 2021-08-12 PROCEDURE — U0005: CPT

## 2021-08-12 PROCEDURE — 85025 COMPLETE CBC W/AUTO DIFF WBC: CPT

## 2021-08-12 PROCEDURE — 59025 FETAL NON-STRESS TEST: CPT

## 2021-08-12 PROCEDURE — 85027 COMPLETE CBC AUTOMATED: CPT

## 2021-08-12 PROCEDURE — 88307 TISSUE EXAM BY PATHOLOGIST: CPT

## 2021-08-12 PROCEDURE — 86901 BLOOD TYPING SEROLOGIC RH(D): CPT

## 2021-08-12 PROCEDURE — G0463: CPT

## 2021-08-12 PROCEDURE — 86769 SARS-COV-2 COVID-19 ANTIBODY: CPT

## 2021-08-12 PROCEDURE — 85384 FIBRINOGEN ACTIVITY: CPT

## 2021-08-12 PROCEDURE — U0003: CPT

## 2021-08-12 PROCEDURE — 86850 RBC ANTIBODY SCREEN: CPT

## 2021-08-12 PROCEDURE — 85610 PROTHROMBIN TIME: CPT

## 2021-08-12 PROCEDURE — 90707 MMR VACCINE SC: CPT

## 2021-08-12 PROCEDURE — 85730 THROMBOPLASTIN TIME PARTIAL: CPT

## 2021-08-12 PROCEDURE — 86900 BLOOD TYPING SEROLOGIC ABO: CPT

## 2021-08-12 PROCEDURE — C1889: CPT

## 2021-08-12 RX ORDER — ACETAMINOPHEN 500 MG
3 TABLET ORAL
Qty: 0 | Refills: 0 | DISCHARGE
Start: 2021-08-12

## 2021-08-12 RX ORDER — IBUPROFEN 200 MG
1 TABLET ORAL
Qty: 0 | Refills: 0 | DISCHARGE
Start: 2021-08-12

## 2021-08-12 RX ADMIN — Medication 600 MILLIGRAM(S): at 09:49

## 2021-08-12 RX ADMIN — Medication 975 MILLIGRAM(S): at 15:37

## 2021-08-12 RX ADMIN — Medication 600 MILLIGRAM(S): at 03:48

## 2021-08-12 RX ADMIN — Medication 975 MILLIGRAM(S): at 12:09

## 2021-08-12 RX ADMIN — Medication 600 MILLIGRAM(S): at 10:30

## 2021-08-12 RX ADMIN — Medication 975 MILLIGRAM(S): at 06:18

## 2021-08-12 RX ADMIN — Medication 600 MILLIGRAM(S): at 02:55

## 2021-08-12 RX ADMIN — Medication 975 MILLIGRAM(S): at 13:06

## 2021-08-12 RX ADMIN — Medication 975 MILLIGRAM(S): at 00:43

## 2021-08-12 RX ADMIN — Medication 975 MILLIGRAM(S): at 05:25

## 2021-08-12 RX ADMIN — Medication 0.5 MILLILITER(S): at 12:09

## 2021-08-12 RX ADMIN — Medication 975 MILLIGRAM(S): at 16:00

## 2021-08-12 NOTE — DISCHARGE NOTE OB - PATIENT PORTAL LINK FT
You can access the FollowMyHealth Patient Portal offered by [facility] by registering at the following website: [email or web address]. By joining [employer]’s FollowMyHealth portal, you will also be able to view your health information using other applications (apps) compatible with our system.

## 2021-08-12 NOTE — DISCHARGE NOTE OB - CARE PROVIDER_API CALL
Luis Fernando Michel J  OBSTETRICS AND GYNECOLOGY  [address] [place] [address]  Phone: [phone]  Fax: [phone]  Follow Up Time:

## 2021-08-12 NOTE — DISCHARGE NOTE OB - MEDICATION SUMMARY - MEDICATIONS TO TAKE
I will START or STAY ON the medications listed below when I get home from the hospital:    acetaminophen 325 mg oral tablet  -- 3 tab(s) by mouth   -- Indication: For repeat c/s    ibuprofen 600 mg oral tablet  -- 1 tab(s) by mouth every 6 hours  -- Indication: For repeat c/s    Prenatal 1 oral capsule  -- Indication: For repeat c/s

## 2021-08-12 NOTE — PROGRESS NOTE ADULT - ASSESSMENT
35yo  at 33 2/7 admitted with PPROM at 33 1/7, currently with intermittent contractions and small amount of cervical change overnight. Pt re-examined at bedside and unchanged (1/L/H). Pt otherwise clinically stable demonstrating no signs of infection    #PPROM  - s/p Azithromycin, continue Ampicillin x 48 hours then transition to Amoxicillin 5 day PO regimen  - s/p BMZ for FLM  - monitor CTX pattern on L&D; minimal cervical change  - assess for return to antepartum unit    #Fetal wellbeing  - BMZ/Monitoring as above  - NICU consulted  - f/u GBS    #Maternal wellbeing  - currently NPO  - reg diet and HSQ upon return to antepartum unit  - SCDs for DVT ppx  - PNV/Folic Acid      C. Diamant, PGY-3
35yo  at 33 4/7 admitted with PPROM at 33 1/7/ Pt currently clinically stable, comfortable and with reactive NST overnight and demonstrating no signs of infection.    #PPROM  - s/p Azithromycin, Ampicillin x 48 hours currently on Amoxicillin 5 day PO regimen  - s/p BMZ for FLM  - plan for delivery at 34 wks w/ repeat CS    #Fetal wellbeing  - BMZ/Monitoring as above  - NICU consulted  - f/u GBS    #Maternal wellbeing  -reg diet  -HSQ  -SCDs for DVT ppx  -PNV/Folic Acid    Blair, PGY-3 
A/P: 35yo POD #3 s/p LTCS.  Patient is stable and doing well post-operatively.    - Continue regular diet.  - Increase ambulation as tolerated.  - Continue standing Ibuprofen and Acetaminophen for pain. Oxycodone available PRN for breakthrough pain.    - POD #3 CBC this morning.   - DVT prophylaxis with Heparin 5000u BID    Amyeo Jereen PGY-1
  A/P: 35yo POD #1 s/p LTCS.  Patient is stable and doing well post-operatively.    - Continue regular diet.  - Increase ambulation.  - d/c PCA and transition to PO pain medication with Tylenol, Motrin and Oxycodone PRN for pain control.    - POD #1 CBC this morning.   - DVT prophylaxis with Heparin 5000u BID    Amyeo Jereen PGY-1
37yo  at 33 5/7 admitted with PPROM at 33 1/7. Pt currently clinically stable, comfortable and with reactive NST overnight and demonstrating no signs of infection on Latency antibiotics    #PPROM  - s/p Azithromycin, Ampicillin x 48 hours currently on Amoxicillin 5 day PO regimen  - s/p BMZ for FLM  - plan for delivery at 34 wks w/ repeat CS; time pending    #Fetal wellbeing  - BMZ/Monitoring as above  - NICU consulted  - f/u GBS    #Maternal wellbeing  -reg diet  -HSQ  -SCDs for DVT ppx  -PNV/Folic Acid    C. Diamant,PGY-3
35yo  at 33 3/7 admitted with PPROM at 33 1/7, currently with intermittent contractions and small amount of cervical change overnight. Pt re-examined at bedside and unchanged (1/L/H). Pt otherwise clinically stable demonstrating no signs of infection    #PPROM  - s/p Azithromycin, continue Ampicillin x 48 hours then transition to Amoxicillin 5 day PO regimen  - s/p BMZ for FLM  - monitor CTX pattern on L&D; minimal cervical change  - plan for delivery at 34 wks w/ repeat CS    #Fetal wellbeing  - BMZ/Monitoring as above  - NICU consulted  - f/u GBS    #Maternal wellbeing  -reg diet  -HSQ  - SCDs for DVT ppx  - PNV/Folic Acid    MGreenman PGY-3
A/P: 35yo POD #2 s/p LTCS.  Patient is stable and doing well post-operatively.    - Continue regular diet.  - Increase ambulation as tolerated.   - Standing Ibuprofen and Acetaminophen for pain, Oxycodone available PRN for severe pain.  - DVT prophylaxis with Heparin 5000u BID    Amyeo Jereen PGY-1
Stable post op

## 2021-08-12 NOTE — DISCHARGE NOTE OB - HOSPITAL COURSE
PPROM at 33+ weeks / admitted - subsequent onset of labor - for repeat c/s. Unremarkable post op course.

## 2021-08-12 NOTE — DISCHARGE NOTE OB - MATERIALS PROVIDED
Vaccinations/[facility]  Screening Program/Breastfeeding Log/Breastfeeding Mother’s Support Group Information/Guide to Postpartum Care/[facility] Hearing Screen Program/Back To Sleep Handout/Shaken Baby Prevention Handout/Breastfeeding Guide and Packet/Birth Certificate Instructions/MMR Vaccination (VIS Pub Date: 2012)

## 2021-08-12 NOTE — PROGRESS NOTE ADULT - SUBJECTIVE AND OBJECTIVE BOX
OB Postpartum Note:  Delivery    S: 37yo now POD #3 s/p LTCS. Her pain is well controlled. She is tolerating a regular diet and passing flatus. Voiding spontaneously and ambulating without difficulty. Denies N/V. Denies CP/SOB/lightheadedness/dizziness.     O:   Vitals:  Vital Signs Last 24 Hrs  T(C): 36.7 (12 Aug 2021 05:28), Max: 37 (11 Aug 2021 13:15)  T(F): 98 (12 Aug 2021 05:28), Max: 98.6 (11 Aug 2021 13:15)  HR: 81 (12 Aug 2021 05:28) (77 - 83)  BP: 109/70 (12 Aug 2021 05:28) (109/70 - 114/71)  BP(mean): --  RR: 18 (12 Aug 2021 05:28) (17 - 18)  SpO2: 99% (12 Aug 2021 05:28) (98% - 100%)    MEDICATIONS  (STANDING):  acetaminophen   Tablet .. 975 milliGRAM(s) Oral <User Schedule>  acetaminophen   Tablet .. 975 milliGRAM(s) Oral <User Schedule>  diphtheria/tetanus/pertussis (acellular) Vaccine (ADAcel) 0.5 milliLiter(s) IntraMuscular once  diphtheria/tetanus/pertussis (acellular) Vaccine (ADAcel) 0.5 milliLiter(s) IntraMuscular once  heparin   Injectable 5000 Unit(s) SubCutaneous every 12 hours  heparin   Injectable 5000 Unit(s) SubCutaneous every 12 hours  ibuprofen  Tablet. 600 milliGRAM(s) Oral every 6 hours  ibuprofen  Tablet. 600 milliGRAM(s) Oral every 6 hours  lactated ringers. 1000 milliLiter(s) (125 mL/Hr) IV Continuous <Continuous>  lactated ringers. 1000 milliLiter(s) (125 mL/Hr) IV Continuous <Continuous>  oxytocin Infusion 333.333 milliUNIT(s)/Min (1000 mL/Hr) IV Continuous <Continuous>  oxytocin Infusion 333.333 milliUNIT(s)/Min (1000 mL/Hr) IV Continuous <Continuous>    MEDICATIONS  (PRN):  diphenhydrAMINE 25 milliGRAM(s) Oral every 6 hours PRN Pruritus  diphenhydrAMINE 25 milliGRAM(s) Oral every 6 hours PRN Pruritus  lanolin Ointment 1 Application(s) Topical every 6 hours PRN Sore Nipples  lanolin Ointment 1 Application(s) Topical every 6 hours PRN Sore Nipples  magnesium hydroxide Suspension 30 milliLiter(s) Oral two times a day PRN Constipation  magnesium hydroxide Suspension 30 milliLiter(s) Oral two times a day PRN Constipation  oxyCODONE    IR 5 milliGRAM(s) Oral every 3 hours PRN Moderate to Severe Pain (4-10)  oxyCODONE    IR 5 milliGRAM(s) Oral once PRN Moderate to Severe Pain (4-10)  oxyCODONE    IR 5 milliGRAM(s) Oral every 3 hours PRN Moderate to Severe Pain (4-10)  oxyCODONE    IR 5 milliGRAM(s) Oral once PRN Moderate to Severe Pain (4-10)  simethicone 80 milliGRAM(s) Chew every 4 hours PRN Gas  simethicone 80 milliGRAM(s) Chew every 4 hours PRN Gas      Labs:  Blood type: B Positive  Rubella IgG: RPR:                           10.4<L>   27.08<H> >-----------< 218    ( 08-10 @ 06:41 )             32.0<L>                        10.7<L>   34.67<H> >-----------< 219    (  @ 19:36 )             33.1<L>                        11.9   14.88<H> >-----------< 229    (  @ 13:19 )             36.8                  PE:  General: NAD, patient resting comfortably in bed  Abdomen: Mildly distended, appropriately tender. No rebound tenderness or guarding.   Incision: Clean, dry, intact.   Extremities: SCDs in place, no erythema.

## 2021-09-03 LAB — SURGICAL PATHOLOGY STUDY: SIGNIFICANT CHANGE UP

## 2022-04-01 ENCOUNTER — RESULT REVIEW (OUTPATIENT)
Age: 37
End: 2022-04-01

## 2022-04-11 NOTE — OB PROVIDER H&P - NS_PROVCHECK_OBGYN_ALL_OB
Impression: Combined forms of age-related cataract, bilateral: H25.813. Plan: Discussed cataract diagnosis with the patient. Cataracts are limiting vision. Discussed risks, benefits and alternatives to surgery including but not limited to: bleeding, infection, risk of vision loss, loss of the eye, need for other surgery. Patient voiced understanding and wishes to proceed. Patient elects surgical treatment. Specialty lens options discussed and pt declines. Patient desires surgery OU, OS first, Standard IOL  (( AIM :-0.25  OU, DEXYCU OU,  NO LENSX, NO ORA , NO LRI OU- AMP )) Patient understands the need for glasses after surgery for BCVA.
Impression: Tear film insufficiency of bilateral lacrimal glands: H04.123. Plan: Dry eyes account for the patient's complaints. There is no evidence of permanent changes to the cornea. Explained condition does not have a cure and will need artificial tears for maintenance. Advised patient to use AT 2-3 times daily.   Discussed with patient will limit the vision post cataract surgery
Impression: Vitreous membranes and strands, bilateral: H43.313. Plan: Discussed rt/rd precaution. Advised patient to call us if patient has onset of new floaters and flashes of light. Monitor. Patient to call us sooner PRN onset of new visual changes.   Discussed with patient will limit the vision post cataract surgery
Patient was informed of the reason for this intervention.

## 2023-01-07 ENCOUNTER — NON-APPOINTMENT (OUTPATIENT)
Age: 38
End: 2023-01-07

## 2023-02-15 ENCOUNTER — APPOINTMENT (OUTPATIENT)
Dept: OTOLARYNGOLOGY | Facility: CLINIC | Age: 38
End: 2023-02-15
Payer: COMMERCIAL

## 2023-02-15 VITALS
WEIGHT: 115 LBS | BODY MASS INDEX: 19.63 KG/M2 | SYSTOLIC BLOOD PRESSURE: 124 MMHG | HEART RATE: 64 BPM | HEIGHT: 64 IN | DIASTOLIC BLOOD PRESSURE: 78 MMHG

## 2023-02-15 PROCEDURE — D0090A COSMETIC BOTOX: CUSTOM

## 2023-02-15 NOTE — ASSESSMENT
[FreeTextEntry1] : 37 year old female s/p botox today - 34 units. Can return in 2 weeks for touch up if needed.

## 2023-02-15 NOTE — HISTORY OF PRESENT ILLNESS
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

## 2023-02-15 NOTE — PROCEDURE
[FreeTextEntry1] : Botox [FreeTextEntry2] : Facial aging [FreeTextEntry3] : GLORIA HORAN presents for botox injection. All risks, benefits, and alternatives were explained to the patient, including risk of asymmetry, ptosis, need for touch-up. The risks of bruising and edema were also discussed. The patient must wait 7-10 days for full effect of botox. The patient gave written informed consent to proceed. \par \par Procedure:\par The area was cleaned with alcohol wipes. The area was injected 1 cc syringe with 30G needle. \par \par Dilution: 0.1 cc = 4 U, dilution 2.5 cc NS in 100 U vial\par Product used: Botox\par Areas injected: \par Glabella: 16\par Forehead: 10\par Crow's feet: 8\par Nasalis:\par Lip:\par Lot# see diagram\par Exp: see diagram\par \par Ice was applied to the area after procedure. The patient was given post-operative instructions.\par

## 2023-03-02 ENCOUNTER — APPOINTMENT (OUTPATIENT)
Dept: OTOLARYNGOLOGY | Facility: CLINIC | Age: 38
End: 2023-03-02

## 2023-05-08 NOTE — OB RN TRIAGE NOTE - NS_TRIAGEADDITIONAL COMMENTS_OBGYN_ALL_OB_FT
PRESCRIPTION REFILLS    FOR REFILL REQUESTS INCLUDING CONTROLLED SUBSTANCES  Please contact your pharmacy at least three (3) business days before your medication runs out.   The pharmacy will then send us a request for your refill.  Please allow 24-48 hours for the refill to be processed.       FOR CONTROLLED SUBSTANCE REFILL REQUESTS   Please call the clinic Monday through Friday, from 8:00am - 5:00pm to speak with a Patient .      LAB AND X-RAY HOURS FOR [address] [place] [address]  Monday - Friday 7 am - 4:30 pm      TEST RESULTS  If your physician has ordered additional laboratory or radiology testing as part of your ongoing plan of care, notification of the test results will be communicated in a timely manner once reviewed by your provider.   -  If your results are normal, you will receive a letter in the mail.   -  If there are any irregularities, you will receive a phone call from our office within 5 - 7 business days.   -  If your results are critical and require more immediate intervention, you will be contacted promptly.       YOUR OPINION MATTERS  You may be receiving a patient satisfaction survey in the mail. We would appreciate it if you could please take the time to complete, as your feedback is very important to us. We strive to make your experience exceptional and your comments help us with that goal. We look forward to hearing from you. Feedback is anonymous unless you choose otherwise.    1 liter of lactated Ringers completed Irregular contractions noted. Pt states she feels only abdominal tightening. Pt discharged with instructions to follow up with her OB at [facility]. Pt states understanding of all discussed. IV removed

## 2023-05-10 ENCOUNTER — APPOINTMENT (OUTPATIENT)
Dept: OTOLARYNGOLOGY | Facility: CLINIC | Age: 38
End: 2023-05-10
Payer: SELF-PAY

## 2023-05-10 VITALS
HEIGHT: 64 IN | DIASTOLIC BLOOD PRESSURE: 76 MMHG | BODY MASS INDEX: 19.63 KG/M2 | WEIGHT: 115 LBS | HEART RATE: 94 BPM | SYSTOLIC BLOOD PRESSURE: 146 MMHG

## 2023-05-10 PROCEDURE — D0090A COSMETIC BOTOX: CUSTOM

## 2023-05-10 NOTE — HISTORY OF PRESENT ILLNESS
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 States was very happy with the results. Would like to address glabella, forehead and crows feet.

## 2023-05-10 NOTE — PROCEDURE
[FreeTextEntry1] : Botox [FreeTextEntry2] : Facial aging [FreeTextEntry3] : GLORIA HORAN presents for botox injection. All risks, benefits, and alternatives were explained to the patient, including risk of asymmetry, ptosis, need for touch-up. The risks of bruising and edema were also discussed. The patient must wait 7-10 days for full effect of botox. The patient gave written informed consent to proceed. \par \par Procedure:\par The area was cleaned with alcohol wipes. The area was injected 1 cc syringe with 30G needle. \par \par Dilution: 0.1 cc = 4 U, dilution 2.5 cc NS in 100 U vial\par Product used: Botox\par Areas injected: \par Glabella: 16\par Forehead: 10\par Crow's feet: 12\par Nasalis:\par Lip:\par Lot# see diagram\par Exp: see diagram\par \par Ice was applied to the area after procedure. The patient was given post-operative instructions.\par

## 2023-06-26 ENCOUNTER — APPOINTMENT (OUTPATIENT)
Dept: MAMMOGRAPHY | Facility: CLINIC | Age: 38
End: 2023-06-26
Payer: COMMERCIAL

## 2023-06-26 ENCOUNTER — OUTPATIENT (OUTPATIENT)
Dept: OUTPATIENT SERVICES | Facility: HOSPITAL | Age: 38
LOS: 1 days | End: 2023-06-26
Payer: COMMERCIAL

## 2023-06-26 DIAGNOSIS — Z00.8 ENCOUNTER FOR OTHER GENERAL EXAMINATION: ICD-10-CM

## 2023-06-26 PROCEDURE — 77067 SCR MAMMO BI INCL CAD: CPT

## 2023-06-26 PROCEDURE — 77067 SCR MAMMO BI INCL CAD: CPT | Mod: 26

## 2023-06-26 PROCEDURE — 77063 BREAST TOMOSYNTHESIS BI: CPT | Mod: 26

## 2023-06-26 PROCEDURE — 77063 BREAST TOMOSYNTHESIS BI: CPT

## 2023-10-25 ENCOUNTER — APPOINTMENT (OUTPATIENT)
Dept: OTOLARYNGOLOGY | Facility: CLINIC | Age: 38
End: 2023-10-25
Payer: SELF-PAY

## 2023-10-25 VITALS
HEIGHT: 64 IN | WEIGHT: 118 LBS | SYSTOLIC BLOOD PRESSURE: 118 MMHG | HEART RATE: 76 BPM | DIASTOLIC BLOOD PRESSURE: 77 MMHG | BODY MASS INDEX: 20.14 KG/M2

## 2023-10-25 PROCEDURE — D0090A COSMETIC BOTOX: CUSTOM

## 2024-02-21 ENCOUNTER — APPOINTMENT (OUTPATIENT)
Dept: MRI IMAGING | Facility: CLINIC | Age: 39
End: 2024-02-21
Payer: COMMERCIAL

## 2024-02-21 ENCOUNTER — OUTPATIENT (OUTPATIENT)
Dept: OUTPATIENT SERVICES | Facility: HOSPITAL | Age: 39
LOS: 1 days | End: 2024-02-21
Payer: COMMERCIAL

## 2024-02-21 DIAGNOSIS — Z00.8 ENCOUNTER FOR OTHER GENERAL EXAMINATION: ICD-10-CM

## 2024-02-21 PROCEDURE — 77049 MRI BREAST C-+ W/CAD BI: CPT | Mod: 26

## 2024-02-21 PROCEDURE — C8908: CPT

## 2024-02-21 PROCEDURE — C8937: CPT

## 2024-02-21 PROCEDURE — A9585: CPT

## 2024-02-28 NOTE — OB RN TRIAGE NOTE - NS_MEANSOFARRIVAL_OBGYN_ALL_OB
----- Message from Sean Burciaga RN sent at 2/21/2024  9:38 AM CST -----  Regarding: Home Monitor INR  [place] will obtain her INR today 2/28/24 on her home monitor. This is a 1 week recheck.      Ambulatory

## 2024-03-01 ENCOUNTER — APPOINTMENT (OUTPATIENT)
Dept: OTOLARYNGOLOGY | Facility: CLINIC | Age: 39
End: 2024-03-01
Payer: SELF-PAY

## 2024-03-01 PROCEDURE — D0090A COSMETIC BOTOX: CUSTOM

## 2024-03-04 NOTE — PROCEDURE
[FreeTextEntry1] : Botox [FreeTextEntry3] : GLORIA HORAN presents for botox injection. All risks, benefits, and alternatives were explained to the patient, including risk of asymmetry, ptosis, need for touch-up. The risks of bruising and edema were also discussed. The patient must wait 7-10 days for full effect of botox. The patient gave written informed consent to proceed.   Procedure: The area was cleaned with alcohol wipes. The area was injected 1 cc syringe with 30G needle.   Dilution: 0.1 cc = 4 U, dilution 2.5 cc NS in 100 U vial Product used: Botox Areas injected:  Glabella: 16 Forehead: 10 Crow's feet: 12 Nasalis: Lip: Lot# see diagram Exp: see diagram  Ice was applied to the area after procedure. The patient was given post-operative instructions. [FreeTextEntry2] : Facial Aging

## 2024-08-07 ENCOUNTER — APPOINTMENT (OUTPATIENT)
Dept: OTOLARYNGOLOGY | Facility: CLINIC | Age: 39
End: 2024-08-07

## 2024-08-07 PROCEDURE — D0090A COSMETIC BOTOX: CUSTOM

## 2024-08-07 NOTE — PROCEDURE
[FreeTextEntry1] : Botox [FreeTextEntry2] : Facial aging [FreeTextEntry3] : GLORIA HORAN presents for botox injection. All risks, benefits, and alternatives were explained to the patient, including risk of asymmetry, ptosis, need for touch-up. The risks of bruising and edema were also discussed. The patient must wait 7-10 days for full effect of botox. The patient gave written informed consent to proceed.   Procedure: The area was cleaned with alcohol wipes. The area was injected 1 cc syringe with 30G needle.   Dilution: 0.1 cc = 4 U, dilution 2.5 cc NS in 100 U vial Product used: Botox Areas injected:  Glabella: 16  Forehead: 10  Crow's feet: 12 Nasalis: Lip: Lot# see diagram Exp: see diagram  Ice was applied to the area after procedure. The patient was given post-operative instructions.

## 2024-08-07 NOTE — HISTORY OF PRESENT ILLNESS
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 States was very happy with the results from last treatment.

## 2025-03-28 NOTE — OB PROVIDER TRIAGE NOTE - INTERNATIONAL TRAVEL
Prior Authorization **INITIATED**    Medication: BETHANECHOL 5 MG/ML ORAL SUSPENSION  Insurance Company: [employer] - Phone [phone] Fax [phone]  Pharmacy Filling the Rx: n/a - Patient has not yet been discharged, and there are no outpatient orders for this medication yet  Start Date: 3/28/2025  Reference #: CoverMyMeds Key: [id number] - [place] Case ID #: [id number]  Comments:  Proactive Prior Authorization      Eva Stewart CPhT  Discharge Pharmacy Liaison  [facility]  Pronouns: She/Her/Hers    Securely message with SDNsquare, Epic Secure Chat, or Wanova  Phone: [phone]  Fax: [phone]  [email or web address]   No